# Patient Record
Sex: FEMALE | Race: WHITE | Employment: FULL TIME | URBAN - METROPOLITAN AREA
[De-identification: names, ages, dates, MRNs, and addresses within clinical notes are randomized per-mention and may not be internally consistent; named-entity substitution may affect disease eponyms.]

---

## 2019-12-13 ENCOUNTER — HOSPITAL ENCOUNTER (OUTPATIENT)
Dept: PHYSICAL THERAPY | Age: 54
Discharge: HOME | End: 2019-12-13
Payer: COMMERCIAL

## 2019-12-13 PROCEDURE — 97110 THERAPEUTIC EXERCISES: CPT

## 2019-12-13 PROCEDURE — 97162 PT EVAL MOD COMPLEX 30 MIN: CPT

## 2019-12-13 NOTE — PROGRESS NOTES
PHYSICAL THERAPY - DAILY TREATMENT NOTE    Patient Name: Opal Hobson        Date: 2019  : 1965   yes Patient  Verified  Visit #:     Insurance: Payor: Alka Brooks / Plan: [address] [place] [address] / Product Type: PPO /      In time: 230 Out time: 305   Total Treatment Time: 35     Medicare/[facility] Time Tracking (below)   Total Timed Codes (min):  na 1:1 Treatment Time:  na     TREATMENT AREA =  Pain in right shoulder [M25.511]    SUBJECTIVE  Pain Level (on 0 to 10 scale):  9  / 10   Medication Changes/New allergies or changes in medical history, any new surgeries or procedures?    no  If yes, update Summary List   Subjective Functional Status/Changes:  []  No changes reported     See POC          OBJECTIVE    10 min Therapeutic Exercise:  [x]  See flow sheet   Rationale:      increase ROM and increase strength to improve the patients ability to perform self care activities. Billed With/As:   [x] TE   [] TA   [] Neuro   [] Self Care Patient Education: [x] Review HEP    [] Progressed/Changed HEP based on:   [] positioning   [] body mechanics   [] transfers   [] heat/ice application    [] other:      Other Objective/Functional Measures:    See POC     Post Treatment Pain Level (on 0 to 10) scale:   6  / 10     ASSESSMENT  Assessment/Changes in Function:     See POC     []  See Progress Note/Recertification   Patient will continue to benefit from skilled PT services to modify and progress therapeutic interventions, address functional mobility deficits, address ROM deficits, address strength deficits, analyze and address soft tissue restrictions, analyze and cue movement patterns, analyze and modify body mechanics/ergonomics and assess and modify postural abnormalities to attain remaining goals.    Progress toward goals / Updated goals:    See POC     PLAN  [x]  Upgrade activities as tolerated yes Continue plan of care   []  Discharge due to :    []  Other:      Therapist: Jie Rubio Brittaney Jung, PT    Date: 12/13/2019 Time: 3:11 PM     Future Appointments   Date Time Provider Stephon Ochoai   12/17/2019  9:30 AM Mearl [facility]   12/20/2019  1:00 PM Evelin Callaway, PT [facility]   12/31/2019  4:00 PM Brigette WU [facility]   1/2/2020  3:00 PM Mearl [facility]   1/7/2020  4:00 PM Mearl [facility]   1/9/2020  4:00 PM Tyson Cowan [facility]   1/14/2020  4:00 PM Tyson Cowan [facility]   1/16/2020  4:00 PM Mearl [facility]   2/6/2020 10:00 AM [facility] MATHEW SCHED   2/6/2020 10:20 AM ZULEMA Nolen

## 2019-12-13 NOTE — PROGRESS NOTES
[facility] [place], [address], [address] [facility]       Phone: [phone]  Fax: [phone] / [phone] [facility]  Patient Name: Sheeba Juarez : 1965   Medical   Diagnosis: R shoulder pain Treatment Diagnosis: Pain in right shoulder [M25.511]   Onset Date: Chronic     Referral Source: [facility]* [facility]): 2019   Prior Hospitalization: See medical history Provider #: [id number]   Prior Level of Function: Chronic history of difficulty with reaching, lifting, pushing, pulling and self care activities. Comorbidities: Asthma, Latex allergy, L RCR    Medications: Verified on Patient Summary List   The Plan of Care and following information is based on the information from the initial evaluation.   ===========================================================================================  Assessment / key information:  Patient is a 62year old female presenting to therapy with signs and symptoms consistent with R shoulder pain. Patient states her symptoms began around  after she had a L RCR. Patient states she had to perform most activities with her R UE and felt like she was over using it. Patient states she recently began to experience significant pain into her R shoulder causing her to go to the MD. Patient received an x-ray and MRI which were positive for bone spurs but no RC tears. Patient received a cortisone injection and referred to PT. Patient reports increased difficulty with reaching, lifting, pushing, pulling, carrying and self care activities. Patient notes symptoms feel better with rest. Patient rates pain at worst 10/10 and 7/10 at best.   Objective Data: Inspection- Poor seated posture, forward head, rounded shoulders.  Shoulder AROM: flex R 85 (P!) L 125 (P!); Abd R 105 (P!), L 125 (P!); DANIELLE R R ear (P!), L top of head (P!); FIR R R hip (P!), L L PSIS (P!). R shoulder PROM limited in all planes secondary to pain: flex 120, abd 100, ER 50. Strength Testing: Shoulder flex R 4-/5 (P!), L 4/5 (P!); abd R 4-/5 (P!), L 4/5 (P!); ER R 4-/5 (P!), L 4/5 (P!); IR R 4/5 (P!), L 4/5 (P!). Special Tests: (+) Empty Can, (+)[place]. FOTO: 36/100. Patient educated on diagnosis, prognosis, POC and HEP. Patient issued copy of HEP and denied additional questions. Patient will benefit from skilled PT in order to address these impairments and functional limitations.   ===========================================================================================  Eval Complexity: History HIGH Complexity :3+ comorbidities / personal factors will impact the outcome/ POC ;  Examination  HIGH Complexity : 4+ Standardized tests and measures addressing body structure, function, activity limitation and / or participation in recreation ; Presentation MEDIUM Complexity : Evolving with changing characteristics ; Decision Making MEDIUM Complexity : FOTO score of 26-74; Overall Complexity MEDIUM  Problem List: pain affecting function, decrease ROM, decrease strength, decrease ADL/ functional abilitiies, decrease activity tolerance and decrease flexibility/ joint mobility   Treatment Plan may include any combination of the following: Therapeutic exercise, Therapeutic activities, Neuromuscular re-education, Physical agent/modality, Manual therapy, Patient education and Functional mobility training  Patient / Family readiness to learn indicated by: asking questions, trying to perform skills and interest  Persons(s) to be included in education: patient (P)  Barriers to Learning/Limitations: no  Measures taken:    Patient Goal (s): Reduce pain, improve motion   Patient self reported health status: good  Rehabilitation Potential: good   Short Term Goals: To be accomplished in  3  weeks:  1.  Patient will demonstrate independence with HEP for self management of symptoms. 2. Patient will report reduction in pain at worst to 5-6/10 in order to improve sleeping habits.  Long Term Goals: To be accomplished in  6  weeks:  1. Patient will improve FOTO to >/= 56/100 in order to improve quality of life. 2. Patient will report reduction in pain at worst to 2-3/10 in order to improve tolerance to self care activities. 3. Patient will improve R shoulder flexion and abduction AROM to 130 degrees without pain in order to improve tolerance to reaching activities. Frequency / Duration:   Patient to be seen  2-3  times per week for 6  weeks:  Patient / Caregiver education and instruction: self care, activity modification and exercises  G-Codes (GP): shakir  Therapist Signature: Jd Zamorano PT Date: 02/13/3172   Certification Period: na Time: 3:05 PM   ===========================================================================================  I certify that the above Physical Therapy Services are being furnished while the patient is under my care. I agree with the treatment plan and certify that this therapy is necessary. Physician Signature:        Date:       Time:     Please sign and return to [facility] or you may fax the signed copy to [phone]. Thank you.

## 2019-12-17 ENCOUNTER — HOSPITAL ENCOUNTER (OUTPATIENT)
Dept: PHYSICAL THERAPY | Age: 54
End: 2019-12-17
Payer: COMMERCIAL

## 2019-12-20 ENCOUNTER — APPOINTMENT (OUTPATIENT)
Dept: PHYSICAL THERAPY | Age: 54
End: 2019-12-20
Payer: COMMERCIAL

## 2020-01-07 ENCOUNTER — APPOINTMENT (OUTPATIENT)
Dept: PHYSICAL THERAPY | Age: 55
End: 2020-01-07
Payer: COMMERCIAL

## 2020-01-09 ENCOUNTER — APPOINTMENT (OUTPATIENT)
Dept: PHYSICAL THERAPY | Age: 55
End: 2020-01-09
Payer: COMMERCIAL

## 2020-01-09 ENCOUNTER — HOSPITAL ENCOUNTER (OUTPATIENT)
Dept: PHYSICAL THERAPY | Age: 55
Discharge: HOME | End: 2020-01-09
Payer: COMMERCIAL

## 2020-01-09 PROCEDURE — 97140 MANUAL THERAPY 1/> REGIONS: CPT

## 2020-01-09 PROCEDURE — 97110 THERAPEUTIC EXERCISES: CPT

## 2020-01-09 NOTE — PROGRESS NOTES
[address] PHYSICAL THERAPY  [address] [place] [address],[facility], [address] - Phone: [phone]  Fax: [phone]  PROGRESS NOTE  Patient Name: Vivi Cali : 1965   Treatment/Medical Diagnosis: Pain in right shoulder [M25.511]   Referral Source: Angie Fletcher*     Date of Initial Visit: 19 Attended Visits: 2 Missed Visits: 3     SUMMARY OF TREATMENT  Patient has attended 2 PT sessions, including an initial evaluation, for R shoulder pain. Gap in attendance from 19 to 20 due to patient being sick, family being sick, and holidays. PT has included manual therapy, therapeutic exercises, patient education, and HEP to improve R shoulder ROM, flexibility, and strength. CURRENT STATUS  Patient has made limited progress with current PT interventions likely due to lack of attendance and significant pain with all movement. Pain elevated with work duties (ie: prolonged desk work) and reaching [facility]. Demonstrates good understanding and compliance with basic HEP. Current objective findings: flex 100 deg, abd 115 deg, functional ER top of head, functional IR to R hip, all directions with end range pain; tenderness noted in R posterior cuff. Goal/Measure of Progress Goal Met? 1. Patient will demonstrate independence with HEP for self management of symptoms. Status at last Eval: n/a Current Status: I and compliant with basic HEP met   2. Patient will report reduction in pain at worst to 5-6/10 in order to improve sleeping habits   Status at last Eval: 10/10 Current Status: 10+/10 Not met     New Goals to be achieved in __4__  weeks:  1. Patient will improve FOTO to >/= 56/100 in order to improve quality of life. 2. Patient will report reduction in pain at worst to 2-3/10 in order to improve tolerance to self care activities.   3. Patient will improve R shoulder flexion and abduction AROM to 130 degrees without pain in order to improve tolerance to reaching activities. RECOMMENDATIONS  Patient would continue to benefit from skilled PT for 2x/wk for 4 weeks to continue to improve R shoulder ROM, flexibility, and strength for functional ADLs and work duties. Thank you for this referral.     If you have any questions/comments please contact us directly at [phone]. Thank you for allowing us to assist in the care of your patient. Therapist Signature: JOHNIE Juárez Date: 1/9/2020     Time: 5:19 PM   NOTE TO PHYSICIAN:  PLEASE COMPLETE THE ORDERS BELOW AND FAX TO   [facility] Physical Therapy: [phone]  If you are unable to process this request in 24 hours please contact our office: [phone]    ___ I have read the above report and request that my patient continue as recommended.   ___ I have read the above report and request that my patient continue therapy with the following changes/special instructions:_________________________________________________________   ___ I have read the above report and request that my patient be discharged from therapy.      Physician Signature:        Date:       Time:

## 2020-01-09 NOTE — PROGRESS NOTES
PHYSICAL THERAPY - DAILY TREATMENT NOTE    Patient Name: Ivis Lee        Date: 2020  : 1965   yes Patient  Verified  Visit #:     Insurance: Payor: Aleyda Edward / Plan: [address] [place] [address] / Product Type: PPO /      In time: 4:00  Out time: 4:40   Total Treatment Time: 40     Medicare/[facility] Time Tracking (below)   Total Timed Codes (min):  40 1:1 Treatment Time: 35     TREATMENT AREA =  Pain in right shoulder [M25.511]    SUBJECTIVE  Pain Level (on 0 to 10 scale): 7  / 10   Medication Changes/New allergies or changes in medical history, any new surgeries or procedures?    no  If yes, update Summary List   Subjective Functional Status/Changes:  []  No changes reported     Patient has not been seen since IE on  due to being sick and the holidays. Reports max pain 10+/10 and min pain 7/10. Notices most pain during work duties and reaching [address]. SEE PN       OBJECTIVE    25/  30 min Therapeutic Exercise:  [x]  See flow sheet   Rationale:      increase ROM and increase strength to improve the patients ability to perform self care activities. 10 min Manual Therapy: STM to R post cuff and R pec minor; R GHJ PROM in all directions   Rationale:      decrease pain, increase ROM and increase tissue extensibility to improve patient's ability to perform reaching activities.        Billed With/As:   [x] TE   [] TA   [] Neuro   [] Self Care Patient Education: [x] Review HEP    [] Progressed/Changed HEP based on:   [] positioning   [] body mechanics   [] transfers   [] heat/ice application    [] other:      Other Objective/Functional Measures:    SEE PN     Post Treatment Pain Level (on 0 to 10) scale:  7  / 10     ASSESSMENT  Assessment/Changes in Function:     SEE PN     []  See Progress Note/Recertification   Patient will continue to benefit from skilled PT services to modify and progress therapeutic interventions, address functional mobility deficits, address ROM deficits, address strength deficits, analyze and address soft tissue restrictions, analyze and cue movement patterns, analyze and modify body mechanics/ergonomics and assess and modify postural abnormalities to attain remaining goals.    Progress toward goals / Updated goals:    SEE PN     PLAN  [x]  Upgrade activities as tolerated yes Continue plan of care   []  Discharge due to :    []  Other:      Therapist: JOHNIE Jimenez    Date: 1/9/2020 Time: 5:18 PM     Future Appointments   Date Time Provider Stephon Edouard   1/9/2020  4:00 PM [facility]   2/6/2020 10:00 AM [facility] ULTRASOUND [facility] OpenPM   2/6/2020 10:20 AM [facility], NP [facility] OpenPM

## 2020-01-14 ENCOUNTER — APPOINTMENT (OUTPATIENT)
Dept: PHYSICAL THERAPY | Age: 55
End: 2020-01-14
Payer: COMMERCIAL

## 2020-01-15 ENCOUNTER — HOSPITAL ENCOUNTER (OUTPATIENT)
Dept: PHYSICAL THERAPY | Age: 55
Discharge: HOME | End: 2020-01-15
Payer: COMMERCIAL

## 2020-01-15 PROCEDURE — 97140 MANUAL THERAPY 1/> REGIONS: CPT

## 2020-01-15 PROCEDURE — 97110 THERAPEUTIC EXERCISES: CPT

## 2020-01-15 NOTE — PROGRESS NOTES
PHYSICAL THERAPY - DAILY TREATMENT NOTE    Patient Name: Leah Heaton        Date: 1/15/2020  : 1965   yes Patient  Verified  Visit #:   3   of   12  Insurance: Payor: Mckenna Owusu / Plan: [facility] PPO / Product Type: PPO /      In time: 3:00 Out time: 3:35   Total Treatment Time: 35     Medicare/[employer] Time Tracking (below)   Total Timed Codes (min):  35 1:1 Treatment Time: 35     TREATMENT AREA =  Pain in right shoulder [M25.511]    SUBJECTIVE  Pain Level (on 0 to 10 scale): 7  / 10   Medication Changes/New allergies or changes in medical history, any new surgeries or procedures?    no  If yes, update Summary List   Subjective Functional Status/Changes:  []  No changes reported     Patient reports no new complaints of shoulder pain . OBJECTIVE    25 min Therapeutic Exercise:  [x]  See flow sheet   Rationale:      increase ROM and increase strength to improve the patients ability to perform self care activities. 10 min Manual Therapy: STM to R post cuff and R pec minor; R GHJ PROM in all directions   Rationale:      decrease pain, increase ROM and increase tissue extensibility to improve patient's ability to perform reaching activities. Billed With/As:   [x] TE   [] TA   [] Neuro   [] Self Care Patient Education: [x] Review HEP    [] Progressed/Changed HEP based on:   [] positioning   [] body mechanics   [] transfers   [] heat/ice application    [] other:      Other Objective/Functional Measures:    1:1 TE = 25' (1 TE billed)    Added T/S extension and low doorway stretch to improve UE functional mobility   Increase TTP in post cuff and pec area      Post Treatment Pain Level (on 0 to 10) scale:  8 / 10     ASSESSMENT  Assessment/Changes in Function:     Noted elevated pain following PT session likely due to PREs.  Encouraged pt to continue to ice at home to reduce pain      []  See Progress Note/Recertification   Patient will continue to benefit from skilled PT services to modify and progress therapeutic interventions, address functional mobility deficits, address ROM deficits, address strength deficits, analyze and address soft tissue restrictions, analyze and cue movement patterns, analyze and modify body mechanics/ergonomics and assess and modify postural abnormalities to attain remaining goals. Progress toward goals / Updated goals:    New Goals to be achieved in __4__  weeks:  1. Patient will improve FOTO to >/= 56/100 in order to improve quality of life. 2. Patient will report reduction in pain at worst to 2-3/10 in order to improve tolerance to self care activities.    3. Patient will improve R shoulder flexion and abduction AROM to 130 degrees without pain in order to improve tolerance to reaching activities.     No significant progress towards PT goals today     PLAN  [x]  Upgrade activities as tolerated yes Continue plan of care   []  Discharge due to :    []  Other:      Therapist: JOHNIE Summers    Date: 1/15/2020 Time: 4:47 PM     Future Appointments   Date Time Provider Stephon Edouard   1/15/2020  3:00 PM Jacqueline Cowan [facility]   2020  2:30 PM Noemi Mccormick [facility]   2020  4:00 PM Chapo Gaviarash [facility]   2020  3:30 PM Chapo arash [facility]   2020  3:30 PM Jacqueline Cowan [facility]   2020  3:30 PM Noemi Mccormick [facility]   2020  4:00 PM Noemi Mccormick [facility]   2020 10:00 AM [facility] [address]  [facility]   2020 10:20 AM Herold Apgar, NP [facility] Open[facility]   2020  4:00 PM Jacqueline Cowan [facility]   2020  4:00 PM Chapo arash [facility]   2020  3:30 PM Noemi Mccormick [address] [place] [address]

## 2020-01-16 ENCOUNTER — HOSPITAL ENCOUNTER (OUTPATIENT)
Dept: PHYSICAL THERAPY | Age: 55
Discharge: HOME | End: 2020-01-16
Payer: COMMERCIAL

## 2020-01-16 ENCOUNTER — APPOINTMENT (OUTPATIENT)
Dept: PHYSICAL THERAPY | Age: 55
End: 2020-01-16
Payer: COMMERCIAL

## 2020-01-16 PROCEDURE — 97110 THERAPEUTIC EXERCISES: CPT

## 2020-01-16 PROCEDURE — 97140 MANUAL THERAPY 1/> REGIONS: CPT

## 2020-01-16 NOTE — PROGRESS NOTES
PHYSICAL THERAPY - DAILY TREATMENT NOTE    Patient Name: Corrie Frey        Date: 2020  : 1965   yes Patient  Verified  Visit #:      12  Insurance: Payor: BLUE CROSS / Plan: [facility] PPO / Product Type: PPO /      In time: 2:29 Out time: 3:12   Total Treatment Time: 49     Medicare/[employer] Time Tracking (below)   Total Timed Codes (min):  39 1:1 Treatment Time: 39     TREATMENT AREA =  Pain in right shoulder [M25.511]    SUBJECTIVE  Pain Level (on 0 to 10 scale): 10  / 10   Medication Changes/New allergies or changes in medical history, any new surgeries or procedures?    no  If yes, update Summary List   Subjective Functional Status/Changes:  []  No changes reported     Patient reports waking up with a lot of pain in the back of the R shoulder       OBJECTIVE    Modalities Rationale:     decrease inflammation and decrease pain to improve patient's ability to perform work duties. min [] Estim, type/location:                                      []  att     []  unatt     []  w/US     []  w/ice    []  w/heat    min []  Mechanical Traction: type/lbs                   []  pro   []  sup   []  int   []  cont    []  before manual    []  after manual    min []  Ultrasound, settings/location:      min []  Iontophoresis w/ dexamethasone, location:                                               []  take home patch       []  in clinic   10 Min [x]  Ice     []  Heat    Location/position: To B shoulders in sitting post-session    min []  Vasopneumatic Device, press/temp:     min []  Other:    [] Skin assessment post-treatment (if applicable):    []  intact    []  redness- no adverse reaction     []redness - adverse reaction:          29 min Therapeutic Exercise:  [x]  See flow sheet   Rationale:      increase ROM and increase strength to improve the patients ability to perform self care activities.       10 min Manual Therapy: STM to R post cuff and R pec minor; R GHJ PROM in all directions   Rationale:      decrease pain, increase ROM and increase tissue extensibility to improve patient's ability to perform reaching activities. Billed With/As:   [x] TE   [] TA   [] Neuro   [] Self Care Patient Education: [x] Review HEP    [] Progressed/Changed HEP based on:   [] positioning   [] body mechanics   [] transfers   [] heat/ice application    [] other:      Other Objective/Functional Measures:    1:1 TE = 29'     Modified therex due to increase pain level  Increase TTP across post cuff      Post Treatment Pain Level (on 0 to 10) scale:  9 / 10     ASSESSMENT  Assessment/Changes in Function:     Noted reduced pain following PT session likely due to modalities. Progress limited thus far due to pain. []  See Progress Note/Recertification   Patient will continue to benefit from skilled PT services to modify and progress therapeutic interventions, address functional mobility deficits, address ROM deficits, address strength deficits, analyze and address soft tissue restrictions, analyze and cue movement patterns, analyze and modify body mechanics/ergonomics and assess and modify postural abnormalities to attain remaining goals. Progress toward goals / Updated goals:    New Goals to be achieved in __4__  weeks:  1. Patient will improve FOTO to >/= 56/100 in order to improve quality of life. 2. Patient will report reduction in pain at worst to 2-3/10 in order to improve tolerance to self care activities.    3. Patient will improve R shoulder flexion and abduction AROM to 130 degrees without pain in order to improve tolerance to reaching activities.     No significant progress towards PT goals today     PLAN  [x]  Upgrade activities as tolerated yes Continue plan of care   []  Discharge due to :    []  Other:      Therapist: JOHNIE Carney    Date: 1/16/2020 Time: 4:00 PM     Future Appointments   Date Time Provider Stephon Edouard   1/21/2020  4:00 PM Kathya Mcdonald 1/23/2020  3:30 PM Melchor, Devora Peabody [facility]   1/28/2020  3:30 PM Hamzah Cowan [facility]   1/30/2020  3:30 PM Rosamaria Greene [facility]   2/4/2020  4:00 PM [facility]   2/6/2020 10:00 AM [facility] OpenPM   2/6/2020 10:20 AM Ronit Anderson NP [facility]   2/6/2020  4:00 PM Hamzah Cowan [facility]   2/11/2020  4:00 PM Hamzah Cowan [facility]   2/13/2020  3:30 PM Rosamaria Greene [facility]

## 2020-01-21 ENCOUNTER — HOSPITAL ENCOUNTER (OUTPATIENT)
Dept: PHYSICAL THERAPY | Age: 55
Discharge: HOME | End: 2020-01-21
Payer: COMMERCIAL

## 2020-01-21 PROCEDURE — 97110 THERAPEUTIC EXERCISES: CPT

## 2020-01-21 PROCEDURE — 97140 MANUAL THERAPY 1/> REGIONS: CPT

## 2020-01-21 NOTE — PROGRESS NOTES
PHYSICAL THERAPY - DAILY TREATMENT NOTE    Patient Name: Gustavo Dexter        Date: 2020  : 1965   yes Patient  Verified  Visit #:      of   12  Insurance: Payor: Julio Ewing / Plan: [facility] PPO / Product Type: PPO /      In time: 4:00 Out time: 4:48   Total Treatment Time: 48     Medicare/BC Time Tracking (below)   Total Timed Codes (min):  38 1:1 Treatment Time: 38     TREATMENT AREA =  Pain in right shoulder [M25.511]    SUBJECTIVE  Pain Level (on 0 to 10 scale): 8  / 10   Medication Changes/New allergies or changes in medical history, any new surgeries or procedures?    no  If yes, update Summary List   Subjective Functional Status/Changes:  []  No changes reported     Patient reports more pain in the back of her R shoulder possibly due to more writing/typing at work        OBJECTIVE    Modalities Rationale:     decrease inflammation and decrease pain to improve patient's ability to perform work duties. min [] Estim, type/location:                                      []  att     []  unatt     []  w/US     []  w/ice    []  w/heat    min []  Mechanical Traction: type/lbs                   []  pro   []  sup   []  int   []  cont    []  before manual    []  after manual    min []  Ultrasound, settings/location:      min []  Iontophoresis w/ dexamethasone, location:                                               []  take home patch       []  in clinic   10 Min [x]  Ice     []  Heat    Location/position: To R shoulder in sitting post-session    min []  Vasopneumatic Device, press/temp:     min []  Other:    [] Skin assessment post-treatment (if applicable):    []  intact    []  redness- no adverse reaction     []redness - adverse reaction:          23 min Therapeutic Exercise:  [x]  See flow sheet   Rationale:      increase ROM and increase strength to improve the patients ability to perform self care activities.       15 min Manual Therapy: STM to R UT, R rhomboids, R post cuff and R pec minor; R GHJ PROM in all directions   Rationale:      decrease pain, increase ROM and increase tissue extensibility to improve patient's ability to perform reaching activities. Billed With/As:   [x] TE   [] TA   [] Neuro   [] Self Care Patient Education: [x] Review HEP    [] Progressed/Changed HEP based on:   [] positioning   [] body mechanics   [] transfers   [] heat/ice application    [] other:      Other Objective/Functional Measures:    1:1 TE = 23'    TTP along rhomboids and muscle belly of UT. Improved PROM flex with elbow flex vs ext. Continued therex per flowsheet     Post Treatment Pain Level (on 0 to 10) scale:  8 / 10     ASSESSMENT  Assessment/Changes in Function:     Patient demo limited progress with PT interventions due to elevated pain levels with all activity. []  See Progress Note/Recertification   Patient will continue to benefit from skilled PT services to modify and progress therapeutic interventions, address functional mobility deficits, address ROM deficits, address strength deficits, analyze and address soft tissue restrictions, analyze and cue movement patterns, analyze and modify body mechanics/ergonomics and assess and modify postural abnormalities to attain remaining goals. Progress toward goals / Updated goals:    No significant progress towards LTGs.       PLAN  [x]  Upgrade activities as tolerated yes Continue plan of care   []  Discharge due to :    []  Other:      Therapist: JOHNIE Corona    Date: 1/21/2020 Time: 5:15 PM     Future Appointments   Date Time Provider Stephon Edouard   1/21/2020  4:00 PM Steven Cowan [facility]   1/23/2020  3:30 PM Brandon Cowan [facility]   1/28/2020  3:30 PM Brandon Cowan [facility]   1/30/2020  3:30 PM Candice Foots [facility]   2/4/2020  4:00 PM Ada Gipson [facility]   2/6/2020 10:00 AM [facility]   2/6/2020 10:20 AM Alicia Knight NP [facility]   2/6/2020 4:00 PM Deepthi Cowan [facility]   2/11/2020  4:00 PM Deepthi Cowan [facility]   2/13/2020  3:30 PM Moon Tesfaye

## 2020-01-23 ENCOUNTER — APPOINTMENT (OUTPATIENT)
Dept: PHYSICAL THERAPY | Age: 55
End: 2020-01-23
Payer: COMMERCIAL

## 2020-01-28 ENCOUNTER — APPOINTMENT (OUTPATIENT)
Dept: PHYSICAL THERAPY | Age: 55
End: 2020-01-28
Payer: COMMERCIAL

## 2020-01-28 ENCOUNTER — HOSPITAL ENCOUNTER (OUTPATIENT)
Dept: PHYSICAL THERAPY | Age: 55
Discharge: HOME | End: 2020-01-28
Payer: COMMERCIAL

## 2020-01-30 ENCOUNTER — HOSPITAL ENCOUNTER (OUTPATIENT)
Dept: PHYSICAL THERAPY | Age: 55
Discharge: HOME | End: 2020-01-30
Payer: COMMERCIAL

## 2020-01-30 PROCEDURE — 97140 MANUAL THERAPY 1/> REGIONS: CPT

## 2020-01-30 PROCEDURE — 97110 THERAPEUTIC EXERCISES: CPT

## 2020-01-30 NOTE — PROGRESS NOTES
PHYSICAL THERAPY - DAILY TREATMENT NOTE    Patient Name: Sujey Velazquez        Date: 2020  : 1965   yes Patient  Verified  Visit #:     Insurance: Payor: Darryle Blander / Plan: [employer] [id number] PPO / Product Type: PPO /      In time: 3:25 Out time: 4:10   Total Treatment Time: 45     Medicare/BCBS Time Tracking (below)   Total Timed Codes (min):  35 1:1 Treatment Time: 25     TREATMENT AREA =  Pain in right shoulder [M25.511]    SUBJECTIVE  Pain Level (on 0 to 10 scale): 8  / 10   Medication Changes/New allergies or changes in medical history, any new surgeries or procedures?    no  If yes, update Summary List   Subjective Functional Status/Changes:  []  No changes reported     Patient reports no new complaints of pain. MD F/U appt the week after 20, unsure of the day       OBJECTIVE    Modalities Rationale:     decrease inflammation and decrease pain to improve patient's ability to perform work duties. min [] Estim, type/location:                                      []  att     []  unatt     []  w/US     []  w/ice    []  w/heat    min []  Mechanical Traction: type/lbs                   []  pro   []  sup   []  int   []  cont    []  before manual    []  after manual    min []  Ultrasound, settings/location:      min []  Iontophoresis w/ dexamethasone, location:                                               []  take home patch       []  in clinic   10 Min [x]  Ice     []  Heat    Location/position: To R shoulder in sitting post-session    min []  Vasopneumatic Device, press/temp:     min []  Other:    [] Skin assessment post-treatment (if applicable):    []  intact    []  redness- no adverse reaction     []redness - adverse reaction:          10/  20 min Therapeutic Exercise:  [x]  See flow sheet   Rationale:      increase ROM and increase strength to improve the patients ability to perform self care activities.       15 min Manual Therapy: STM to R UT, R rhomboids, R post cuff and R pec minor; R GHJ PROM in all directions   Rationale:      decrease pain, increase ROM and increase tissue extensibility to improve patient's ability to perform reaching activities. Billed With/As:   [x] TE   [] TA   [] Neuro   [] Self Care Patient Education: [x] Review HEP    [] Progressed/Changed HEP based on:   [] positioning   [] body mechanics   [] transfers   [] heat/ice application    [] other:      Other Objective/Functional Measures:    1:1 TE = 10'    Continued therex per flowsheet  ERP with elevation in SAS noted during MT      Post Treatment Pain Level (on 0 to 10) scale:  7 / 10     ASSESSMENT  Assessment/Changes in Function:     Patient making limited progress with current PT interventions due to elevated pain with all GHJ movement. []  See Progress Note/Recertification   Patient will continue to benefit from skilled PT services to modify and progress therapeutic interventions, address functional mobility deficits, address ROM deficits, address strength deficits, analyze and address soft tissue restrictions, analyze and cue movement patterns, analyze and modify body mechanics/ergonomics and assess and modify postural abnormalities to attain remaining goals. Progress toward goals / Updated goals:    No significant progress towards LTGs.       PLAN  [x]  Upgrade activities as tolerated yes Continue plan of care   []  Discharge due to :    []  Other:      Therapist: JOHNIE Corona    Date: 1/30/2020 Time: 5:30 PM     Future Appointments   Date Time Provider Stephon Edouard   1/30/2020  3:30 PM [facility]   2/4/2020  4:00 PM [facility]   2/6/2020 10:00 AM [facility]   2/6/2020 10:20 AM Alicia Knight NP [facility]   2/6/2020  4:00 PM [facility]   2/11/2020  4:00 PM Brandon Cowan [facility]   2/13/2020  3:30 PM [facility]

## 2020-02-04 ENCOUNTER — HOSPITAL ENCOUNTER (OUTPATIENT)
Dept: PHYSICAL THERAPY | Age: 55
Discharge: HOME | End: 2020-02-04
Payer: COMMERCIAL

## 2020-02-04 PROCEDURE — 97140 MANUAL THERAPY 1/> REGIONS: CPT

## 2020-02-04 PROCEDURE — 97110 THERAPEUTIC EXERCISES: CPT

## 2020-02-04 NOTE — PROGRESS NOTES
[address] [place] [address], [address] [facility] - Phone: [phone]  Fax: [phone] SUMMARY  Patient Name: Cecilia Dudley : 1965   Treatment/Medical Diagnosis: Pain in right shoulder [M25.511]   Referral Source: Grabiel Miller*     Date of Initial Visit: 19 Attended Visits: 7 Missed Visits: 4     SUMMARY OF TREATMENT  Patient attended 7 PT sessions, including an initial evaluation, for R shoulder pain. Gap in attendance from 19 to 20 due to patient being sick, family being sick, and holidays. PT has included manual therapy, therapeutic exercises, patient education, and HEP to improve R shoulder ROM, flexibility, and strength. CURRENT STATUS  Patient has made limited progress with R shoulder pain with current PT interventions. Patient reports no improvement since beginning PT for R shoulder pain. In the last 2 weeks, min pain 2/10 and max 7/10. Patient contributes most of the pain to prolonged elevation at 90 deg for work duties, primarily located in the anterior shoulder area. Able to temporarily alleviate pain with ice. Current objective findings: R shoulder AROM flex 110 deg, abd 105 deg, functional ER top of head, functional IR R hip. All directions with end range pain; R shoulder PROM flex 130 deg, abd 125 deg, ER (with arm abd at 45 deg) 40 deg, IR (with arm abd at 45 deg) 35 deg. All directions with end range pain; significant tenderness in anterior shoulder/biceps tendon area; R shoulder strength reduced and painful in all directions    Goal/Measure of Progress Goal Met? 1. Patient will improve FOTO to >/= 56/100 in order to improve quality of life. Status at last Eval: 36/100 Current Status: 43/100 progressing   2. Patient will report reduction in pain at worst to 2-3/10 in order to improve tolerance to self care activities.    Status at last Eval: 10/10 Current Status: 10/10 No change   3. Patient will improve R shoulder flexion and abduction AROM to 130 degrees without pain in order to improve tolerance to reaching activities.    Status at last Eval: Flex 100 deg   abd 115 deg Current Status: Flex 110 deg  abd 105 deg Partially progressing     RECOMMENDATIONS  Discontinue therapy due to lack of appreciable progress towards goals. Patient was advised to return to your office for further assessment. Thank you for this referral.     If you have any questions/comments please contact us directly at [phone]. Thank you for allowing us to assist in the care of your patient.     Therapist Signature: JOHNIE Wolfe Date: 02/04/20     Time: 5:16 PM

## 2020-02-04 NOTE — PROGRESS NOTES
PHYSICAL THERAPY - DAILY TREATMENT NOTE    Patient Name: Ivis eLe        Date: 2020  : 1965   yes Patient  Verified  Visit #:     Insurance: Payor: Rajwinderdarien Edward / Plan: [facility] PPO / Product Type: PPO /      In time: 3:55 Out time: 4:48   Total Treatment Time: 53     Medicare/[facility] Time Tracking (below)   Total Timed Codes (min):  43 1:1 Treatment Time: 30     TREATMENT AREA =  Pain in right shoulder [M25.511]    SUBJECTIVE  Pain Level (on 0 to 10 scale): 7  / 10   Medication Changes/New allergies or changes in medical history, any new surgeries or procedures?    no  If yes, update Summary List   Subjective Functional Status/Changes:  []  No changes reported     Patient reports no improvement since beginning PT for R shoulder pain. In the last 2 weeks, min pain 2/10 and max 7/10. Patient contributes most of the pain to prolonged elevation at 90 deg for work duties, primarily located in the anterior shoulder area. Able to temporarily alleviate pain with ice. SEE D/C       OBJECTIVE    Modalities Rationale:     decrease inflammation and decrease pain to improve patient's ability to perform work duties.     min [] Estim, type/location:                                      []  att     []  unatt     []  w/US     []  w/ice    []  w/heat    min []  Mechanical Traction: type/lbs                   []  pro   []  sup   []  int   []  cont    []  before manual    []  after manual    min []  Ultrasound, settings/location:      min []  Iontophoresis w/ dexamethasone, location:                                               []  take home patch       []  in clinic   10 Min [x]  Ice     []  Heat    Location/position: To R shoulder in semi recline post-session    min []  Vasopneumatic Device, press/temp:     min []  Other:    [] Skin assessment post-treatment (if applicable):    []  intact    []  redness- no adverse reaction     []redness - adverse reaction:          15/  28 min Therapeutic Exercise:  [x]  See flow sheet   Rationale:      increase ROM and increase strength to improve the patients ability to perform self care activities. 15 min Manual Therapy: STM to R UT, R rhomboids, R post cuff and R pec minor; R GHJ PROM in all directions   Rationale:      decrease pain, increase ROM and increase tissue extensibility to improve patient's ability to perform reaching activities. Billed With/As:   [x] TE   [] TA   [] Neuro   [] Self Care Patient Education: [x] Review HEP    [] Progressed/Changed HEP based on:   [] positioning   [] body mechanics   [] transfers   [] heat/ice application    [] other:      Other Objective/Functional Measures:    1:1 TE = 15'    R shoulder AROM: flex 110 deg, abd 105 deg, functional ER top of head, functional IR R hip. All directions with end range pain  R shoulder PROM: flex 130 deg, abd 125 deg, ER (with arm abd at 45 deg) 40 deg, IR (with arm abd at 45 deg) 35 deg. All directions with end range pain    SEE D/C     Post Treatment Pain Level (on 0 to 10) scale:  6 / 10     ASSESSMENT  Assessment/Changes in Function:     SEE D/C     []  See Progress Note/Recertification   Patient will continue to benefit from skilled PT services to modify and progress therapeutic interventions, address functional mobility deficits, address ROM deficits, address strength deficits, analyze and address soft tissue restrictions, analyze and cue movement patterns, analyze and modify body mechanics/ergonomics and assess and modify postural abnormalities to attain remaining goals. Progress toward goals / Updated goals:    SEE D/C     PLAN  []  Upgrade activities as tolerated no Continue plan of care   [x]  Discharge due to : Min to no progress towards PT goals. Returning to MD assessment.     []  Other:      Therapist: Claudius Litten Melchor, LPTA    Date: 2/4/2020 Time: 5:07 PM     Future Appointments   Date Time Provider Stephon Edouard   2/4/2020  4:00 PM Nat Gonzalez [facility]   2/6/2020 10:00 AM [facility]   2/6/2020 10:20 AM [place], [facility]ULE[facility]   2/6/2020  4:00 PM Merlene Cowan [facility]   2/11/2020  4:00 PM Merlene Cowan [facility]   2/13/2020  3:30 PM Aisha Carrington [facility]

## 2020-02-06 ENCOUNTER — APPOINTMENT (OUTPATIENT)
Dept: PHYSICAL THERAPY | Age: 55
End: 2020-02-06
Payer: COMMERCIAL

## 2020-02-11 ENCOUNTER — APPOINTMENT (OUTPATIENT)
Dept: PHYSICAL THERAPY | Age: 55
End: 2020-02-11
Payer: COMMERCIAL

## 2020-02-13 ENCOUNTER — APPOINTMENT (OUTPATIENT)
Dept: PHYSICAL THERAPY | Age: 55
End: 2020-02-13
Payer: COMMERCIAL

## 2020-03-12 ENCOUNTER — HOSPITAL ENCOUNTER (OUTPATIENT)
Dept: PHYSICAL THERAPY | Age: 55
Discharge: HOME | End: 2020-03-12
Payer: COMMERCIAL

## 2020-03-12 PROCEDURE — 97530 THERAPEUTIC ACTIVITIES: CPT

## 2020-03-12 PROCEDURE — 97162 PT EVAL MOD COMPLEX 30 MIN: CPT

## 2020-03-12 NOTE — PROGRESS NOTES
PHYSICAL THERAPY - DAILY TREATMENT NOTE    Patient Name: Lamar Perez        Date: 3/12/2020  : 1965   yes Patient  Verified  Visit #:   1     Insurance: Payor: Claudeen Lao / Plan: [facility] PPO / Product Type: PPO /      In time: 835 Out time: 915   Total Treatment Time: 40     Medicare/BCBS Time Tracking (below)   Total Timed Codes (min):  10 1:1 Treatment Time:  10     TREATMENT AREA =  Right shoulder pain [M25.511]    SUBJECTIVE  Pain Level (on 0 to 10 scale):  6  / 10   Medication Changes/New allergies or changes in medical history, any new surgeries or procedures?    no  If yes, update Summary List   Subjective Functional Status/Changes:  []  No changes reported     SEE POC         OBJECTIVE  Modalities Rationale:     decrease inflammation and decrease pain to improve patient's ability to perform functional ADL's   min [] Estim, type/location:                                      []  att     []  unatt     []  w/US     []  w/ice    []  w/heat    min []  Mechanical Traction: type/lbs                   []  pro   []  sup   []  int   []  cont    []  before manual    []  after manual    min []  Ultrasound, settings/location:      min []  Iontophoresis w/ dexamethasone, location:                                               []  take home patch       []  in clinic   10 min [x]  Ice     []  Heat    location/position: Semireclined to the R shoulder    min []  Vasopneumatic Device, press/temp:     min []  Other:    [] Skin assessment post-treatment (if applicable):    []  intact    []  redness- no adverse reaction     []redness - adverse reaction:        20 min Evaluation     10 min Therapeutic Activity:  Educated patient on the proper don/doff technique for the sling.  Pt able to   Rationale:      increase patient education of proper sling use/wear     Billed With/As:   [] TE   [] TA   [] Neuro   [] Self Care Patient Education: [x] Review HEP    [] Progressed/Changed HEP based on: [] positioning   [] body mechanics   [] transfers   [] heat/ice application    [] other:      Other Objective/Functional Measures:    SEE POC    Pt was unable to tolerate PROM for objective assessment at the IE due to significant increase in pain as well as reports of \"feeling faint\". Pt was given water and CP was put on the R shoulder. Pt reports symptoms subsided within about 2-3 minutes. Pt was able to perform donning her sling I following the IE and verbalized she felt back to normal. Pt also verbalized she felt safe to return home following IE and was able to I leave the clinic. Post Treatment Pain Level (on 0 to 10) scale:   6  / 10     ASSESSMENT  Assessment/Changes in Function:     Evaluation Code Complexity: History MEDIUM  Complexity : 1-2 comorbidities / personal factors will impact the outcome/ POC ; Examination HIGH Complexity : 4+ Standardized tests and measures addressing body structure, function, activity limitation and / or participation in recreation ; Presentation MEDIUM Complexity : Evolving with changing characteristics ; Decision Making Other outcome measures clinical judgement  MEDIUM; Complexity MEDIUM    Justification for Eval Code Complexity:  Patient History : Asthma, Latex allergy  Examination SEE ABOVE EXAM  Clinical Presentation: evolving pain  Clinical Decision Making : clinical judgement         []  See Progress Note/Recertification   Patient will continue to benefit from skilled PT services to modify and progress therapeutic interventions, address functional mobility deficits, address ROM deficits, address strength deficits, analyze and address soft tissue restrictions, analyze and cue movement patterns, analyze and modify body mechanics/ergonomics and assess and modify postural abnormalities to attain remaining goals.    Progress toward goals / Updated goals:    SEE POC     PLAN  []  Upgrade activities as tolerated yes Continue plan of care   []  Discharge due to :    [x] Other: Pt will be seen 1-2 times per week for 18-24 sessions.       Therapist: Justine Lorenzo, PT, DPT    Date: 3/12/2020 Time: 8:23 AM     Future Appointments   Date Time Provider Stephon Edouard   3/12/2020  9:00 AM Elizabeth Higgins, PT [facility]

## 2020-03-12 NOTE — PROGRESS NOTES
[address] [place] [address], [address] [facility] - Phone: [phone]  Fax: 24-02-98-50 OF CARE / [address] [facility]  Patient Name: Chari Perry : 1965   Medical   Diagnosis: Right shoulder pain [M25.511] Treatment Diagnosis: R shoulder arthroscopy 3/6/2020   Onset Date: DOS 3/6/2020     Referral Source: Jelani Oliva Start of Care [facility]): 3/12/2020   Prior Hospitalization: See medical history Provider #: [id number]   Prior Level of Function: Chronic history of difficulty with reaching, lifting, pushing, pulling and self care activities. Comorbidities: Asthma, Latex Allergy, L RCR    Medications: Verified on Patient Summary List   The Plan of Care and following information is based on the information from the initial evaluation.   ==================================================================================  Assessment / key information:  Pt is 62year old female presenting s/p R Shoulder Arthroscopy on 3/6/2020. Pt reports gradual onset of symptoms prior to surgery following her L RTC repair in . Pt had conservative treatment prior to surgery including cortisone injections and PT. Pt presented to PT in abductor sling, however was not donned appropriately. Pain range 0-6/10. Pt denies any falls or complications following surgery. Pt denies any N/T in the lower arm or fingers. Pt reports compliance with HEP given by MD 3 times per day. Current Exam Findings: FHRS posture in sitting. 4 incisions along the shoulder covered with band-aids. Pt reports taking bandage off 3 days ago and covering with band-aids. Pt had moderate difficulty with PROM performed by PT due to increased pain symptoms and feeling \"faint\". Pt also reported not eating breakfast prior to appointment and PROM was held until first follow up visit.  Pt was able to recover from symptoms and verbalized she felt back to normal prior to leaving the clinic. Strength and special testing not performed. Sensation intact to light touch in the forearm and fingers. Pt educated on MD protocol, signs and symptoms of infection, as well as given and able to demonstrate correct for with initial HEP. The patient was instructed in a home exercise program to address the above findings/deficits. Pt will benefit from PT interventions to address the aforementioned deficits and allow pt to return to OF.      ==================================================================================  Eval Complexity: History MEDIUM  Complexity : 1-2 comorbidities / personal factors will impact the outcome/ POC ;  Examination  HIGH Complexity : 4+ Standardized tests and measures addressing body structure, function, activity limitation and / or participation in recreation ; Presentation MEDIUM Complexity : Evolving with changing characteristics ; Decision Making Other outcome measures clinical judgement  MEDIUM; Overall Complexity MEDIUM  Problem List: pain affecting function, decrease ROM, decrease strength, decrease ADL/ functional abilitiies, decrease activity tolerance, decrease flexibility/ joint mobility and decrease transfer abilities   Treatment Plan may include any combination of the following: Therapeutic exercise, Therapeutic activities, Neuromuscular re-education, Physical agent/modality, Manual therapy, Patient education, Self Care training and Functional mobility training  Patient / Family readiness to learn indicated by: asking questions, trying to perform skills and interest  Persons(s) to be included in education: patient (P)   Barriers to Learning/Limitations: None  Measures taken, if barriers to learning:    Patient Goal (s): Improve shoulder pain   Patient self reported health status: fair  Rehabilitation Potential: good  · Short Term Goals: To be accomplished in  6  treatments:  1.  Independent/compliant with long term HEP for shoulder PROM  2. Pt will achieve 130 degrees shoulder flexion passively to improve functional arm mobility. 3. Pt will be able to I don/doff sling in order to perform HEP as well as as bathing/dressing  · Long Term Goals: To be accomplished in  12  treatments:  1. Pt will progress towards AAROM/AROM in order to progress with functional ADL's.  2. Pt will be able to achieve 50-60 degrees ER passively in order to improve functional arm mobility. 3. Pt will report max pain <4/10 in order to improve functional ADL's     Frequency / Duration:   Patient to be seen  1-2  times per week for 18-24  treatments:  Patient / Caregiver education and instruction: exercises  G-Codes (GP): PRIYANKA  Therapist Signature: Poncho Murguia PT, DPT   Date: 8/11/0238   Certification Period: NA Time: 8:14 AM   ==================================================================================  I certify that the above Physical Therapy Services are being furnished while the patient is under my care. I agree with the treatment plan and certify that this therapy is necessary. Physician Signature:        Date:       Time:     Please sign and return to [facility] at [facility] [place], [facility]. or you may fax the signed copy to [phone]. Thank you.

## 2020-03-16 ENCOUNTER — HOSPITAL ENCOUNTER (OUTPATIENT)
Dept: PHYSICAL THERAPY | Age: 55
Discharge: HOME | End: 2020-03-16
Payer: COMMERCIAL

## 2020-03-16 PROCEDURE — 97140 MANUAL THERAPY 1/> REGIONS: CPT

## 2020-03-16 PROCEDURE — 97110 THERAPEUTIC EXERCISES: CPT

## 2020-03-16 NOTE — PROGRESS NOTES
PHYSICAL THERAPY - DAILY TREATMENT NOTE    Patient Name: Vivi Cali        Date: 3/16/2020  : 1965   yes Patient  Verified  Visit #:   2     Insurance: Payor: Dorie Landon / Plan: [facility] PPO / Product Type: PPO /      In time: 4:20 Out time: 5:31   Total Treatment Time: 71     Medicare/[facility] Time Tracking (below)   Total Timed Codes (min):  51 1:1 Treatment Time:  51     TREATMENT AREA =  Right shoulder pain [M25.511]    SUBJECTIVE  Pain Level (on 0 to 10 scale):  7 / 10   Medication Changes/New allergies or changes in medical history, any new surgeries or procedures?    no  If yes, update Summary List   Subjective Functional Status/Changes:  []  No changes reported     Patient reports not hurting as bad as it did the first time she had to come for PT. States that she's been taking 2 Aleve and 1 aspirin every day to help with the pain. Reports good HEP compliance and use of sling.    MD F/U appt on Wed (3/18)        OBJECTIVE  Modalities Rationale:     decrease inflammation and decrease pain to improve patient's ability to perform functional ADL's   min [] Estim, type/location:                                      []  att     []  unatt     []  w/US     []  w/ice    []  w/heat    min []  Mechanical Traction: type/lbs                   []  pro   []  sup   []  int   []  cont    []  before manual    []  after manual    min []  Ultrasound, settings/location:      min []  Iontophoresis w/ dexamethasone, location:                                               []  take home patch       []  in clinic   21 Min [x]  Ice     [x]  Heat    location/position: 10' MHP pre tx and 10' CP post tx in Semireclined to the R shoulder    min []  Vasopneumatic Device, press/temp:     min []  Other:    [] Skin assessment post-treatment (if applicable):    []  intact    []  redness- no adverse reaction     []redness - adverse reaction:          31 min Therapeutic Exercise:  [x]  See flow sheet Rationale:      increase ROM and increase strength to improve the patients ability to perform dressing activities     20 min Manual Therapy: STM to R UT; R GHJ and elbow PROM within MD protocol    Rationale:      decrease pain and increase ROM to improve patient's PROM>AAROM>AROM within MD protocol. Billed With/As:   [x] TE   [] TA   [] Neuro   [] Self Care Patient Education: [x] Review HEP    [] Progressed/Changed HEP based on:   [] positioning   [] body mechanics   [] transfers   [] heat/ice application    [] other:      Other Objective/Functional Measures:    1:1 TE = 31'     Initiated therex per POC (see flowsheet) to improve R shoulder mobility for functional ADLs. Req'd cues 100 % of therex for proper form/technique due to 1st F/U appt. R shoulder PROM flex: ~100 deg before onset of SAS pain   R elbow PROM ext: 5 deg from neutral        Post Treatment Pain Level (on 0 to 10) scale:  5  / 10     ASSESSMENT  Assessment/Changes in Function:     Demonstrated no exacerbation of symptoms following initiation of PT interventions. []  See Progress Note/Recertification   Patient will continue to benefit from skilled PT services to modify and progress therapeutic interventions, address functional mobility deficits, address ROM deficits, address strength deficits, analyze and address soft tissue restrictions, analyze and cue movement patterns, analyze and modify body mechanics/ergonomics and assess and modify postural abnormalities to attain remaining goals.    Progress toward goals / Updated goals:    Good progress towards STGs #1 and #3     PLAN  [x]  Upgrade activities as tolerated yes Continue plan of care   []  Discharge due to :    []  Other:      Therapist: Zollie Lesches, LPTA    Date: 3/16/2020 Time: 5:53 PM     Future Appointments   Date Time Provider Stephon Edouard   3/16/2020  4:30 PM [address] [facility], Xiomara Sathish [facility]   3/18/2020  4:30 PM Timoteo Vega [facility]   3/25/2020  4:30 PM Chapo Breanna Necessary [facility]   3/27/2020  2:30 PM Breanna Cowan [facility]   3/30/2020  4:30 PM Breanna Cowan [facility]   4/1/2020  4:30 PM Sylvester [place] [facility]   4/7/2020  4:30 PM Zach Mccollum, PT [facility]   4/9/2020  [facility]   [facility]/14/2020  [facility]   4/16/2020  2:30 PM [facility]   4/20/2020  4:30 PM Breanna Cowan [facility]   4/22/2020  4:30 PM Breanna Cowan [facility]   4/27/2020  4:30 PM Breanna Coawn [facility]   4/29/2020  4:30 PM Sylvester [facility]

## 2020-03-18 ENCOUNTER — HOSPITAL ENCOUNTER (OUTPATIENT)
Dept: PHYSICAL THERAPY | Age: 55
Discharge: HOME | End: 2020-03-18
Payer: COMMERCIAL

## 2020-03-18 PROCEDURE — 97140 MANUAL THERAPY 1/> REGIONS: CPT

## 2020-03-18 PROCEDURE — 97110 THERAPEUTIC EXERCISES: CPT

## 2020-03-18 NOTE — PROGRESS NOTES
PHYSICAL THERAPY - DAILY TREATMENT NOTE    Patient Name: Julius Lockwood        Date: 3/18/2020  : 1965   yes Patient  Verified  Visit #:   3     Insurance: Payor: Lin Romo / Plan: [facility] PPO / Product Type: PPO /      In time: 4:33 Out time:  5:27   Total Treatment Time: 54     Medicare/[employer] Time Tracking (below)   Total Timed Codes (min):  34 1:1 Treatment Time:  34     TREATMENT AREA =  Right shoulder pain [M25.511]    SUBJECTIVE  Pain Level (on 0 to 10 scale):  7 / 10   Medication Changes/New allergies or changes in medical history, any new surgeries or procedures?    no  If yes, update Summary List   Subjective Functional Status/Changes:  []  No changes reported     Patient reports more pain because she had stitches removed. Also reports \"doctor told me I could start using this arm but not moving it too much over my head.  I could also leave it supported on the couch when I'm not in the sling\"       OBJECTIVE  Modalities Rationale:     decrease inflammation and decrease pain to improve patient's ability to perform functional ADL's   min [] Estim, type/location:                                      []  att     []  unatt     []  w/US     []  w/ice    []  w/heat    min []  Mechanical Traction: type/lbs                   []  pro   []  sup   []  int   []  cont    []  before manual    []  after manual    min []  Ultrasound, settings/location:      min []  Iontophoresis w/ dexamethasone, location:                                               []  take home patch       []  in clinic   21 Min [x]  Ice     [x]  Heat    location/position: 10' MHP pre tx and 10' CP post tx in Semireclined to the R shoulder    min []  Vasopneumatic Device, press/temp:     min []  Other:    [] Skin assessment post-treatment (if applicable):    []  intact    []  redness- no adverse reaction     []redness - adverse reaction:          20 min Therapeutic Exercise:  [x]  See flow sheet   Rationale: increase ROM and increase strength to improve the patients ability to perform dressing activities     14 min Manual Therapy: R GHJ and elbow PROM within MD protocol    Rationale:      decrease pain and increase ROM to improve patient's PROM>AAROM>AROM within MD protocol. Billed With/As:   [x] TE   [] TA   [] Neuro   [] Self Care Patient Education: [x] Review HEP    [] Progressed/Changed HEP based on:   [] positioning   [] body mechanics   [] transfers   [] heat/ice application    [] other:      Other Objective/Functional Measures:    1:1 TE = 20'     R shoulder PROM in supine: flex 100 deg, ER/IR (arm abd at 45 deg) 50/45 deg  R elbow PROM ext: 10 deg from neutral   Presented with steristrips over clean portals     Post Treatment Pain Level (on 0 to 10) scale:  5  / 10     ASSESSMENT  Assessment/Changes in Function:     Educated patient on MD post surgical protocol per patient's subjective statement. Patient verbalized understanding     []  See Progress Note/Recertification   Patient will continue to benefit from skilled PT services to modify and progress therapeutic interventions, address functional mobility deficits, address ROM deficits, address strength deficits, analyze and address soft tissue restrictions, analyze and cue movement patterns, analyze and modify body mechanics/ergonomics and assess and modify postural abnormalities to attain remaining goals.    Progress toward goals / Updated goals:    Good progress towards STGs #1 and #3     PLAN  [x]  Upgrade activities as tolerated yes Continue plan of care   []  Discharge due to :    []  Other:      Therapist: JOHNIE Hatfield    Date: 3/18/2020 Time: 5:41 PM     Future Appointments   Date Time Provider Stephon Edouard   3/25/2020  4:30 PM [address] [facility], Hamzah Jim[facility]   3/27/2020  2:30 PM Hamzah Cowan [facility]   3/30/2020  4:30 PM Melchor, Devora Peabody [facility]   4/1/2020  4:30 PM Rosamaria Greene [facility]   4/7/2020  4:30 PM Kayla De La Rosa PT [facility]   4/9/2020  4:30 PM Andriy Myles, PT [facility]   4/14/2020  2:30 PM Dondra Self [facility]   4/16/2020  2:30 PM Dondra Self [facility]   4/20/2020  4:30 PM Merlene Cowan [facility]   4/22/2020  4:30 PM Merlene Cowan [facility]   4/27/2020  4:30 PM Merlene Cowan [facility]   4/29/2020  4:30 PM Aisha Carrington [facility]

## 2020-03-25 ENCOUNTER — APPOINTMENT (OUTPATIENT)
Dept: PHYSICAL THERAPY | Age: 55
End: 2020-03-25
Payer: COMMERCIAL

## 2020-03-25 ENCOUNTER — HOSPITAL ENCOUNTER (OUTPATIENT)
Dept: PHYSICAL THERAPY | Age: 55
Discharge: HOME | End: 2020-03-25
Payer: COMMERCIAL

## 2020-03-25 PROCEDURE — 97110 THERAPEUTIC EXERCISES: CPT

## 2020-03-25 PROCEDURE — 97140 MANUAL THERAPY 1/> REGIONS: CPT

## 2020-03-25 NOTE — PROGRESS NOTES
PHYSICAL THERAPY - DAILY TREATMENT NOTE    Patient Name: Lamar Perez        Date: 3/25/2020  : 1965   yes Patient  Verified  Visit #:   4     Insurance: Payor: Claudeen Lao / Plan: [facility] PPO / Product Type: PPO /      In time: 4:30 Out time: 5:25   Total Treatment Time: 55     Medicare/[facility] Time Tracking (below)   Total Timed Codes (min):  35 1:1 Treatment Time:  35     TREATMENT AREA =  Right shoulder pain [M25.511]    SUBJECTIVE  Pain Level (on 0 to 10 scale):  3 / 10   Medication Changes/New allergies or changes in medical history, any new surgeries or procedures?    no  If yes, update Summary List   Subjective Functional Status/Changes:  []  No changes reported     Patient reports feeling a little better today compared to previous PT sessions.         OBJECTIVE  Modalities Rationale:     decrease inflammation and decrease pain to improve patient's ability to perform functional ADL's   min [] Estim, type/location:                                      []  att     []  unatt     []  w/US     []  w/ice    []  w/heat    min []  Mechanical Traction: type/lbs                   []  pro   []  sup   []  int   []  cont    []  before manual    []  after manual    min []  Ultrasound, settings/location:      min []  Iontophoresis w/ dexamethasone, location:                                               []  take home patch       []  in clinic   21 Min [x]  Ice     [x]  Heat    location/position: 10' MHP pre tx and 10' CP post tx in Semireclined to the R shoulder    min []  Vasopneumatic Device, press/temp:     min []  Other:    [] Skin assessment post-treatment (if applicable):    []  intact    []  redness- no adverse reaction     []redness - adverse reaction:          20 min Therapeutic Exercise:  [x]  See flow sheet   Rationale:      increase ROM and increase strength to improve the patients ability to perform dressing activities     15 min Manual Therapy: R GHJ and elbow PROM within MD protocol    Rationale:      decrease pain and increase ROM to improve patient's PROM>AAROM>AROM within MD protocol. Billed With/As:   [x] TE   [] TA   [] Neuro   [] Self Care Patient Education: [x] Review HEP    [] Progressed/Changed HEP based on:   [] positioning   [] body mechanics   [] transfers   [] heat/ice application    [] other:      Other Objective/Functional Measures:    1:1 TE = 20'    Added AAROM flex and ER to improve shoulder mobility. R shoulder PROM: flex 125 deg, ER/IR with arm abd 45 deg: 15 deg/20 deg  R elbow PROM: flex full, ext 10 deg from full     Post Treatment Pain Level (on 0 to 10) scale:  2  / 10     ASSESSMENT  Assessment/Changes in Function:     Steady progress with current PT interventions indicated by reduced pain levels and improved ROM. []  See Progress Note/Recertification   Patient will continue to benefit from skilled PT services to modify and progress therapeutic interventions, address functional mobility deficits, address ROM deficits, address strength deficits, analyze and address soft tissue restrictions, analyze and cue movement patterns, analyze and modify body mechanics/ergonomics and assess and modify postural abnormalities to attain remaining goals. Progress toward goals / Updated goals: · Short Term Goals: To be accomplished in  6  treatments:  1. Independent/compliant with long term HEP for shoulder PROM  2. Pt will achieve 130 degrees shoulder flexion passively to improve functional arm mobility. Progressing 03/25; 125 deg   3. Pt will be able to I don/doff sling in order to perform HEP as well as as bathing/dressing met 03/25  · Long Term Goals: To be accomplished in  12  treatments:  1.  Pt will progress towards AAROM/AROM in order to progress with functional ADL's.  2. Pt will be able to achieve 50-60 degrees ER passively in order to improve functional arm mobility.   3. Pt will report max pain <4/10 in order to improve functional ADL's PLAN  [x]  Upgrade activities as tolerated yes Continue plan of care   []  Discharge due to :    []  Other:      Therapist: JOHNIE Talavera    Date: 3/25/2020 Time: 5:33 PM     Future Appointments   Date Time Provider Stephon Edouard   3/25/2020  4:30 PM Chapo, [facility]   3/27/2020  2:30 PM Lilliana Cowan [facility]   3/30/2020  4:30 PM Chapo, [facility]   4/1/2020  4:30 PM [facility]   4/7/2020  4:30 PM Milton De La Rosa, PT [facility]   4/9/2020  4:30 PM Clary Barrios, PT [facility]   4/14/2020  2:30 PM [facility]   4/16/2020  2:30 PM [facility]   4/20/2020  4:30 PM Lilliana Cowan [facility]   [facility]22/2020  [facility]   4/27/2020  4:30 PM Lilliana Cowan [facility]   4/29/2020  4:30 PM [facility]

## 2020-03-27 ENCOUNTER — HOSPITAL ENCOUNTER (OUTPATIENT)
Dept: PHYSICAL THERAPY | Age: 55
Discharge: HOME | End: 2020-03-27
Payer: COMMERCIAL

## 2020-03-27 ENCOUNTER — APPOINTMENT (OUTPATIENT)
Dept: PHYSICAL THERAPY | Age: 55
End: 2020-03-27
Payer: COMMERCIAL

## 2020-03-27 PROCEDURE — 97140 MANUAL THERAPY 1/> REGIONS: CPT

## 2020-03-27 PROCEDURE — 97110 THERAPEUTIC EXERCISES: CPT

## 2020-03-27 NOTE — PROGRESS NOTES
PHYSICAL THERAPY - DAILY TREATMENT NOTE    Patient Name: Sherri Ochoa        Date: 3/27/2020  : 1965   yes Patient  Verified  Visit #:   5     Insurance: Payor: Vitor Lancaster / Plan: [facility] PPO / Product Type: PPO /      In time: 2:03 Out time: 3:03   Total Treatment Time: 60     Medicare/BCBS Time Tracking (below)   Total Timed Codes (min):  40 1:1 Treatment Time:  40     TREATMENT AREA =  Right shoulder pain [M25.511]    SUBJECTIVE  Pain Level (on 0 to 10 scale):  2-3 / 10   Medication Changes/New allergies or changes in medical history, any new surgeries or procedures?    no  If yes, update Summary List   Subjective Functional Status/Changes:  []  No changes reported     Patient reports not feeling too bad today, just some soreness in the biceps (pointing to distal area) and in the back of the arm (pointing to post cuff)       OBJECTIVE  Modalities Rationale:     decrease inflammation and decrease pain to improve patient's ability to perform functional ADL's   min [] Estim, type/location:                                      []  att     []  unatt     []  w/US     []  w/ice    []  w/heat    min []  Mechanical Traction: type/lbs                   []  pro   []  sup   []  int   []  cont    []  before manual    []  after manual    min []  Ultrasound, settings/location:      min []  Iontophoresis w/ dexamethasone, location:                                               []  take home patch       []  in clinic   10 Min [x]  Ice     [x]  Heat    location/position: 10' MHP pre tx and 10' CP post tx in Semireclined to the R shoulder    min []  Vasopneumatic Device, press/temp:     min []  Other:    [] Skin assessment post-treatment (if applicable):    []  intact    []  redness- no adverse reaction     []redness - adverse reaction:          25 min Therapeutic Exercise:  [x]  See flow sheet   Rationale:      increase ROM and increase strength to improve the patients ability to perform dressing activities     15 min Manual Therapy: STM to R post cuff; R GHJ and elbow PROM within MD protocol    Rationale:      decrease pain and increase ROM to improve patient's PROM>AAROM>AROM within MD protocol. Billed With/As:   [x] TE   [] TA   [] Neuro   [] Self Care Patient Education: [x] Review HEP    [] Progressed/Changed HEP based on:   [] positioning   [] body mechanics   [] transfers   [] heat/ice application    [] other:      Other Objective/Functional Measures:    1:1 TE = 25'    Added pulleys and increase repetitions with cane flex to improve shoulder mobility    Increase tenderness around posterior portal and lateral portal sites. Post Treatment Pain Level (on 0 to 10) scale:  0 / 10     ASSESSMENT  Assessment/Changes in Function:     Patient noted no pain level following PT session indicating good tolerance with today's PT interventions. []  See Progress Note/Recertification   Patient will continue to benefit from skilled PT services to modify and progress therapeutic interventions, address functional mobility deficits, address ROM deficits, address strength deficits, analyze and address soft tissue restrictions, analyze and cue movement patterns, analyze and modify body mechanics/ergonomics and assess and modify postural abnormalities to attain remaining goals. Progress toward goals / Updated goals: · Short Term Goals: To be accomplished in  6  treatments:  1. Independent/compliant with long term HEP for shoulder PROM  2. Pt will achieve 130 degrees shoulder flexion passively to improve functional arm mobility. Progressing 03/25; 125 deg   3. Pt will be able to I don/doff sling in order to perform HEP as well as as bathing/dressing met 03/25  · Long Term Goals: To be accomplished in  12  treatments:  1.  Pt will progress towards AAROM/AROM in order to progress with functional ADL's.  2. Pt will be able to achieve 50-60 degrees ER passively in order to improve functional arm mobility.   3. Pt will report max pain <4/10 in order to improve functional ADL's     PLAN  [x]  Upgrade activities as tolerated yes Continue plan of care   []  Discharge due to :    []  Other:      Therapist: JOHNIE Johnson    Date: 3/27/2020 Time: 3:11 PM     Future Appointments   Date Time Provider Stephon Edouard   3/27/2020  2:30 PM Mamadou Damico [facility]   3/31/2020  1:00 PM Erin Cowan [facility]   4/2/2020  3:00 PM Elisha Cowan [facility]   4/7/2020  3:00 PM Erin Cowan [facility]   4/9/2020  3:00 PM Erin Cowan [facility]   4/14/2020  3:00 PM Chapo Erin [facility]   4/16/2020  3:00 PM Neil Sykes [facility]   4/20/2020  2:00 PM Nba Bledsoe, PT [facility]   4/22/2020  4:00 PM Leonie Record [facility]   4/27/2020  2:00 PM Leonie Record [facility]   4/29/2020  4:00 PM Hartzog, Rosina Shone, PT [facility]

## 2020-03-30 ENCOUNTER — APPOINTMENT (OUTPATIENT)
Dept: PHYSICAL THERAPY | Age: 55
End: 2020-03-30
Payer: COMMERCIAL

## 2020-03-31 ENCOUNTER — HOSPITAL ENCOUNTER (OUTPATIENT)
Dept: PHYSICAL THERAPY | Age: 55
Discharge: HOME | End: 2020-03-31
Payer: COMMERCIAL

## 2020-03-31 PROCEDURE — 97110 THERAPEUTIC EXERCISES: CPT

## 2020-03-31 PROCEDURE — 97140 MANUAL THERAPY 1/> REGIONS: CPT

## 2020-03-31 NOTE — PROGRESS NOTES
PHYSICAL THERAPY - DAILY TREATMENT NOTE    Patient Name: Philippe Whitehead        Date: 3/31/2020  : 1965   yes Patient  Verified  Visit #:   6     Insurance: Payor: Missy Martinez / Plan: [facility] PPO / Product Type: PPO /      In time: 12:54 Out time: 1:58   Total Treatment Time: 64     Medicare/BCBS Time Tracking (below)   Total Timed Codes (min):  44 1:1 Treatment Time:  44     TREATMENT AREA =  Right shoulder pain [M25.511]    SUBJECTIVE  Pain Level (on 0 to 10 scale): 4 / 10   Medication Changes/New allergies or changes in medical history, any new surgeries or procedures?    no  If yes, update Summary List   Subjective Functional Status/Changes:  []  No changes reported     Patient reports having elevated pain beginning Saturday afternoon until today. States that it's primarily in the posterior portal site and \"stabbing\" pain in lateral shoulder.          OBJECTIVE  Modalities Rationale:     decrease inflammation and decrease pain to improve patient's ability to perform functional ADL's   min [] Estim, type/location:                                      []  att     []  unatt     []  w/US     []  w/ice    []  w/heat    min []  Mechanical Traction: type/lbs                   []  pro   []  sup   []  int   []  cont    []  before manual    []  after manual    min []  Ultrasound, settings/location:      min []  Iontophoresis w/ dexamethasone, location:                                               []  take home patch       []  in clinic   21 Min [x]  Ice     [x]  Heat    location/position: 10' MHP pre tx and 10' CP post tx in Semireclined to the R shoulder    min []  Vasopneumatic Device, press/temp:     min []  Other:    [] Skin assessment post-treatment (if applicable):    []  intact    []  redness- no adverse reaction     []redness - adverse reaction:          29 min Therapeutic Exercise:  [x]  See flow sheet   Rationale:      increase ROM and increase strength to improve the patients ability to perform dressing activities     15 min Manual Therapy: STM to R post cuff; R GHJ and elbow PROM within MD protocol    Rationale:      decrease pain and increase ROM to improve patient's PROM>AAROM>AROM within MD protocol. Billed With/As:   [x] TE   [] TA   [] Neuro   [] Self Care Patient Education: [x] Review HEP    [] Progressed/Changed HEP based on:   [] positioning   [] body mechanics   [] transfers   [] heat/ice application    [] other:      Other Objective/Functional Measures:    1:1 TE = 29'    R shoulder PROM flex: 130 deg. No signs of infection or red flags around portal sites. No steristrips on posterior portal sites, lateral and anterior steristrips in tact. Added shoulder rolls to improve shoulder mobility. Post Treatment Pain Level (on 0 to 10) scale:  1-2 / 10     ASSESSMENT  Assessment/Changes in Function:     Patient making steady progress with current PT interventions noted by reduced pain levels and improved ROM. Encouraged pt to perform shoulder rolls and reduce sling during sitting activities (with arm supported). Patient acknowledged understanding. []  See Progress Note/Recertification   Patient will continue to benefit from skilled PT services to modify and progress therapeutic interventions, address functional mobility deficits, address ROM deficits, address strength deficits, analyze and address soft tissue restrictions, analyze and cue movement patterns, analyze and modify body mechanics/ergonomics and assess and modify postural abnormalities to attain remaining goals. Progress toward goals / Updated goals: · Short Term Goals: To be accomplished in  6  treatments:  1. Independent/compliant with long term HEP for shoulder PROM  2. Pt will achieve 130 degrees shoulder flexion passively to improve functional arm mobility. Met 03/31; 130 deg    3.  Pt will be able to I don/doff sling in order to perform HEP as well as as bathing/dressing met 03/25    · Long Term Goals: To be accomplished in  12  treatments:  1.  Pt will progress towards AAROM/AROM in order to progress with functional ADL's.  2. Pt will be able to achieve 50-60 degrees ER passively in order to improve functional arm mobility.   3. Pt will report max pain <4/10 in order to improve functional ADL's     PLAN  [x]  Upgrade activities as tolerated yes Continue plan of care   []  Discharge due to :    []  Other:      Therapist: JOHNIE Cavazos    Date: 3/31/2020 Time: 2:36 PM     Future Appointments   Date Time Provider Stephon Edouard   3/31/2020  1:00 PM [address] [place] [address]   4/2/2020  3:00 PM Chapo [facility]   4/7/2020  3:00 PM Chapo [facility]   4/9/2020  3:00 PM Chapo [facility]   4/14/2020  3:00 PM Chapo [facility]   4/16/2020  3:00 PM [address] [place] [address]   4/20/2020  2:00 PM Ann Morin, PT [facility]   4/22/2020  4:00 PM Holli Shone [facility]   4/27/2020  2:00 PM Holli Shone [facility]   4/29/2020  4:00 PM Maria Isabel De La Rosa, PT [facility]

## 2020-04-01 ENCOUNTER — APPOINTMENT (OUTPATIENT)
Dept: PHYSICAL THERAPY | Age: 55
End: 2020-04-01
Payer: COMMERCIAL

## 2020-04-02 ENCOUNTER — HOSPITAL ENCOUNTER (OUTPATIENT)
Dept: PHYSICAL THERAPY | Age: 55
Discharge: HOME | End: 2020-04-02
Payer: COMMERCIAL

## 2020-04-02 PROCEDURE — 97140 MANUAL THERAPY 1/> REGIONS: CPT

## 2020-04-02 PROCEDURE — 97110 THERAPEUTIC EXERCISES: CPT
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
## 2020-04-07 ENCOUNTER — APPOINTMENT (OUTPATIENT)
Dept: PHYSICAL THERAPY | Age: 55
End: 2020-04-07
Payer: COMMERCIAL

## 2020-04-07 ENCOUNTER — HOSPITAL ENCOUNTER (OUTPATIENT)
Dept: PHYSICAL THERAPY | Age: 55
Discharge: HOME | End: 2020-04-07
Payer: COMMERCIAL

## 2020-04-07 PROCEDURE — 97140 MANUAL THERAPY 1/> REGIONS: CPT

## 2020-04-07 PROCEDURE — 97110 THERAPEUTIC EXERCISES: CPT

## 2020-04-07 NOTE — PROGRESS NOTES
PHYSICAL THERAPY - DAILY TREATMENT NOTE    Patient Name: Ashlee Bartholomew        Date: 2020  : 1965   yes Patient  Verified  Visit #:   8     Insurance: Payor: Elina Husain / Plan: [facility] PPO / Product Type: PPO /      In time: 3:01 Out time: 3:55   Total Treatment Time: 54     Medicare/BCBS Time Tracking (below)   Total Timed Codes (min):  44 1:1 Treatment Time:  44     TREATMENT AREA =  Right shoulder pain [M25.511]    SUBJECTIVE  Pain Level (on 0 to 10 scale): 3 / 10   Medication Changes/New allergies or changes in medical history, any new surgeries or procedures?    no  If yes, update Summary List   Subjective Functional Status/Changes:  []  No changes reported     Patient reports getting sharp pain down the back of the arm when trying to raise her shoulder. States she has been massaging her shoulder with Vitamin E and icing throughout her day.         OBJECTIVE  Modalities Rationale:     decrease inflammation and decrease pain to improve patient's ability to perform functional ADL's   min [] Estim, type/location:                                      []  att     []  unatt     []  w/US     []  w/ice    []  w/heat    min []  Mechanical Traction: type/lbs                   []  pro   []  sup   []  int   []  cont    []  before manual    []  after manual    min []  Ultrasound, settings/location:      min []  Iontophoresis w/ dexamethasone, location:                                               []  take home patch       []  in clinic   10 Min [x]  Ice     []  Heat    location/position: To R shoulder pain in semi recline    min []  Vasopneumatic Device, press/temp:     min []  Other:    [] Skin assessment post-treatment (if applicable):    []  intact    []  redness- no adverse reaction     []redness - adverse reaction:          30 min Therapeutic Exercise:  [x]  See flow sheet   Rationale:      increase ROM and increase strength to improve the patients ability to perform dressing activities     14 min Manual Therapy: STM to R post cuff and R infra/teres minor; R GHJ and elbow PROM within MD protocol    Rationale:      decrease pain and increase ROM to improve patient's PROM>AAROM>AROM within MD protocol. Billed With/As:   [x] TE   [] TA   [] Neuro   [] Self Care Patient Education: [x] Review HEP    [] Progressed/Changed HEP based on:   [] positioning   [] body mechanics   [] transfers   [] heat/ice application    [] other:      Other Objective/Functional Measures:    1:1 TE = 30'    Progressed therex to improve R shoulder AAROM/AROM and elbow AROM (see flowsheet). Significant TTP and palpable trigger points in infraspinatus noted during MT. (+) wincing and jump sign     Post Treatment Pain Level (on 0 to 10) scale:  1 / 10     ASSESSMENT  Assessment/Changes in Function:     Patient progressing with ROM within MD protocol. Steady progress with shoulder AROM without compensation. []  See Progress Note/Recertification   Patient will continue to benefit from skilled PT services to modify and progress therapeutic interventions, address functional mobility deficits, address ROM deficits, address strength deficits, analyze and address soft tissue restrictions, analyze and cue movement patterns, analyze and modify body mechanics/ergonomics and assess and modify postural abnormalities to attain remaining goals. Progress toward goals / Updated goals:    Long Term Goals: To be accomplished in  12  treatments:  1. Pt will progress towards AAROM/AROM in order to progress with functional ADL's. Progressing 04/02  2. Pt will be able to achieve 50-60 degrees ER passively in order to improve functional arm mobility. progressing 04/02  3.  Pt will report max pain <4/10 in order to improve functional ADL's; progressing 04/02; max pain 4/10      PLAN  [x]  Upgrade activities as tolerated yes Continue plan of care   []  Discharge due to :    []  Other:      Therapist: JOHNIE Sullivan    Date: 4/7/2020 Time: 3:56 PM     Future Appointments   Date Time Provider Stephon Silke   4/7/2020  3:00 PM [facility]   4/9/2020  3:00 PM Chapo [facility]   4/14/2020  3:00 PM Chapo [facility]   4/16/2020  3:00 PM Berhane Barroso [facility]   4/20/2020  2:00 PM Alicia Miguel, PT [facility]   4/22/2020  4:00 PM Nikkie [facility]   4/27/2020  2:00 PM Nikkie [facility]   4/29/2020  4:00 PM Serenity De La Rosa, PT [facility]

## 2020-04-09 ENCOUNTER — HOSPITAL ENCOUNTER (OUTPATIENT)
Dept: PHYSICAL THERAPY | Age: 55
Discharge: HOME | End: 2020-04-09
Payer: COMMERCIAL

## 2020-04-09 ENCOUNTER — APPOINTMENT (OUTPATIENT)
Dept: PHYSICAL THERAPY | Age: 55
End: 2020-04-09
Payer: COMMERCIAL

## 2020-04-09 PROCEDURE — 97110 THERAPEUTIC EXERCISES: CPT

## 2020-04-09 PROCEDURE — 97140 MANUAL THERAPY 1/> REGIONS: CPT

## 2020-04-09 NOTE — PROGRESS NOTES
PHYSICAL THERAPY - DAILY TREATMENT NOTE    Patient Name: Mariela Lambert        Date: 2020  : 1965   yes Patient  Verified  Visit #:     Insurance: Payor: Onur Costello / Plan: [facility] PPO / Product Type: PPO /      In time: 2:58 Out time: 4:06   Total Treatment Time: 68     Medicare/BCBS Time Tracking (below)   Total Timed Codes (min):  58 1:1 Treatment Time:  58     TREATMENT AREA =  Right shoulder pain [M25.511]    SUBJECTIVE  Pain Level (on 0 to 10 scale): 3/ 10   Medication Changes/New allergies or changes in medical history, any new surgeries or procedures?    no  If yes, update Summary List   Subjective Functional Status/Changes:  []  No changes reported     Patient reports feeling better than she did yesterday. States that she was sitting at work doing computer work and demonstrated her arms near full extension. States her pain could have been a 10/10 (pointing to distal biceps) and called Dr. Meade Ivonne office to let them know. Patient spoke with Alberto Ibrahim and Alberto Ibrahim spoke with Angus Sood. Per pt, Angus Sood stated that it's likely due to being out of the sling and the strengthening exercises.         OBJECTIVE  Modalities Rationale:     decrease inflammation and decrease pain to improve patient's ability to perform functional ADL's   min [] Estim, type/location:                                      []  att     []  unatt     []  w/US     []  w/ice    []  w/heat    min []  Mechanical Traction: type/lbs                   []  pro   []  sup   []  int   []  cont    []  before manual    []  after manual    min []  Ultrasound, settings/location:      min []  Iontophoresis w/ dexamethasone, location:                                               []  take home patch       []  in clinic   10 Min [x]  Ice     []  Heat    location/position: To R shoulder pain in semi recline    min []  Vasopneumatic Device, press/temp:     min []  Other:    [] Skin assessment post-treatment (if applicable):    []  intact    []  redness- no adverse reaction     []redness - adverse reaction:          38 min Therapeutic Exercise:  [x]  See flow sheet   Rationale:      increase ROM and increase strength to improve the patients ability to perform dressing activities     20 min Manual Therapy: Biceps assessment; STM to R biceps; R GHJ and elbow PROM within MD protocol    Rationale:      decrease pain and increase ROM to improve patient's PROM>AAROM>AROM within MD protocol. Billed With/As:   [x] TE   [] TA   [] Neuro   [] Self Care Patient Education: [x] Review HEP    [] Progressed/Changed HEP based on:   [] positioning   [] body mechanics   [] transfers   [] heat/ice application    [] other:      Other Objective/Functional Measures:    1:1 TE = 45'    Examined R biceps per pt's subjective statement. Increase tenderness along biceps otherwise no signs of bruising or redness; biceps still intact. Educated pt on red flags during times with significant pain. Increase abd and shoulder hike with finger ladder. Post Treatment Pain Level (on 0 to 10) scale:  1.5 / 10     ASSESSMENT  Assessment/Changes in Function:     Patient noted reduced pain following PT session. Demonstrates occasional wincing and sharp inhales during therex, however pt notes more \"pulling\" vs sharp pain. Educated pt to notify therapist if therex becomes painful. Pt verbalized understanding. []  See Progress Note/Recertification   Patient will continue to benefit from skilled PT services to modify and progress therapeutic interventions, address functional mobility deficits, address ROM deficits, address strength deficits, analyze and address soft tissue restrictions, analyze and cue movement patterns, analyze and modify body mechanics/ergonomics and assess and modify postural abnormalities to attain remaining goals. Progress toward goals / Updated goals:    Long Term Goals: To be accomplished in  12  treatments:  1.  Pt will progress towards AAROM/AROM in order to progress with functional ADL's. Progressing 04/02  2. Pt will be able to achieve 50-60 degrees ER passively in order to improve functional arm mobility. progressing 04/02  3.  Pt will report max pain <4/10 in order to improve functional ADL's; progressing 04/02; max pain 4/10      PLAN  [x]  Upgrade activities as tolerated yes Continue plan of care   []  Discharge due to :    []  Other:      Therapist: JOHNIE Sow    Date: 4/9/2020 Time: 5:08 PM     Future Appointments   Date Time Provider Stephon Edouard   [facility]2020  [facility]   4/14/2020  3[facility]   [facility]   4/20/2020  2:00 PM Grady Rosa, PT [facility]   4/22/2020  4:00 PM [facility]   4/27/2020  2:00 PM [facility]   4/29/2020  4:00 PM Yanna De La Rosa, PT [facility]

## 2020-04-14 ENCOUNTER — HOSPITAL ENCOUNTER (OUTPATIENT)
Dept: PHYSICAL THERAPY | Age: 55
Discharge: HOME | End: 2020-04-14
Payer: COMMERCIAL

## 2020-04-14 ENCOUNTER — APPOINTMENT (OUTPATIENT)
Dept: PHYSICAL THERAPY | Age: 55
End: 2020-04-14
Payer: COMMERCIAL

## 2020-04-14 PROCEDURE — 97140 MANUAL THERAPY 1/> REGIONS: CPT

## 2020-04-14 PROCEDURE — 97110 THERAPEUTIC EXERCISES: CPT

## 2020-04-14 NOTE — PROGRESS NOTES
[address] [place] [address] PHYSICAL THERAPY  [address] [place] [address] 201,[facility], [facility] - Phone: [phone]  Fax: [phone]  PROGRESS NOTE  Patient Name: Mariela Lambert : 1965   Treatment/Medical Diagnosis: Right shoulder pain [M25.511]   Referral Source: Ofeliayang Gianni*     Date of Initial Visit: 20 Attended Visits: 10 Missed Visits: 0     SUMMARY OF TREATMENT  PT interventions have included manual therapy (PROM/STM within MD protocol), therapeutic exercises to improve shoulder and elbow PROM>AAROM>AROM, patient education, and HEP. Modalities to increase tissue extensibility and pain control. CURRENT STATUS  Patient has attended 9 f/u appts since initial eval on 20 and is currently 5 weeks post op. Patient has been making slow, but steady progress with PT interventions. In the last 2 weeks, min pain, avg pain 3/10, noted 1 instance of max pain 10/10 due to prolonged elbow extension with work activities. Current objective findings: R shoulder PROM: flex/scap 130 deg, ER 52 deg, IR 50 deg;   R shoulder AA/AROM: flex 121 deg p!/115 deg p!; functional ER top of head, functional IR to R glute;   R shoulder strength reduced in all directions; gross strength 3/5   R elbow P/AROM: flex 148 deg/140 deg, ext 0 deg/3 deg    Goal/Measure of Progress Goal Met? 1. Pt will progress towards AAROM/AROM in order to progress with functional ADL's. Status at last Eval: NT Current Status: See above progressing   2. Pt will be able to achieve 50-60 degrees ER passively in order to improve functional arm mobility. Status at last Eval: NT Current Status: 50 deg met   3. Pt will report max pain <4/10 in order to improve functional ADL's   Status at last Eval: 6/10 Current Status: 10/10 Not met     New Goals to be achieved in __8__  treatments:  1. Pt will progress towards AAROM/AROM in R shoulder and R elbow to regain functional reach for dressing activities. 2. Pt will report max pain <4/10 in order to improve tolerance with functional ADL's.   3. Pt to demo >/= to 4/5 in R shoulder strength to complete work duties and household activities. RECOMMENDATIONS  Pt would continue to benefit from skilled PT for 2x/wk for 8 treatments to continue to improve R shoulder and R elbow ROM, flexibility, and strength within MD protocol for functional ADLs and work duties. If you have any questions/comments please contact us directly at [phone]. Thank you for allowing us to assist in the care of your patient. Therapist Signature: JOHNIE Troy Date: 4/14/2020    Olvin Carpio, PT Time: 3:54 PM   NOTE TO PHYSICIAN:  PLEASE COMPLETE THE ORDERS BELOW AND FAX TO   [facility]: [phone]  If you are unable to process this request in 24 hours please contact our office: [phone]    ___ I have read the above report and request that my patient continue as recommended.   ___ I have read the above report and request that my patient continue therapy with the following changes/special instructions:_________________________________________________________   ___ I have read the above report and request that my patient be discharged from therapy.      Physician Signature:        Date:       Time:

## 2020-04-14 NOTE — PROGRESS NOTES
PHYSICAL THERAPY - DAILY TREATMENT NOTE    Patient Name: Molly Whittaker        Date: 2020  : 1965   yes Patient  Verified  Visit #:   10   of   18-24  Insurance: Payor: Shila Brito / Plan: [facility] PPO / Product Type: PPO /      In time: 3:00 Out time: 4:00   Total Treatment Time: 60     Medicare/BCBS Time Tracking (below)   Total Timed Codes (min):  50 1:1 Treatment Time:  50     TREATMENT AREA =  Right shoulder pain [M25.511]    SUBJECTIVE  Pain Level (on 0 to 10 scale): 2-2.5 / 10   Medication Changes/New allergies or changes in medical history, any new surgeries or procedures?    no  If yes, update Summary List   Subjective Functional Status/Changes:  []  No changes reported     SEE PN       OBJECTIVE  Modalities Rationale:     decrease inflammation and decrease pain to improve patient's ability to perform functional ADL's   min [] Estim, type/location:                                      []  att     []  unatt     []  w/US     []  w/ice    []  w/heat    min []  Mechanical Traction: type/lbs                   []  pro   []  sup   []  int   []  cont    []  before manual    []  after manual    min []  Ultrasound, settings/location:      min []  Iontophoresis w/ dexamethasone, location:                                               []  take home patch       []  in clinic   10 Min [x]  Ice     []  Heat    location/position: To R shoulder pain in semi recline    min []  Vasopneumatic Device, press/temp:     min []  Other:    [] Skin assessment post-treatment (if applicable):    []  intact    []  redness- no adverse reaction     []redness - adverse reaction:          35 min Therapeutic Exercise:  [x]  See flow sheet   Rationale:      increase ROM and increase strength to improve the patients ability to perform dressing activities     15 min Manual Therapy: Shoulder and elbow assessment; STM to R biceps; R GHJ and elbow PROM within MD protocol    Rationale:      decrease pain and increase ROM to improve patient's PROM>AAROM>AROM within MD protocol. Billed With/As:   [x] TE   [] TA   [] Neuro   [] Self Care Patient Education: [x] Review HEP    [] Progressed/Changed HEP based on:   [] positioning   [] body mechanics   [] transfers   [] heat/ice application    [] other:      Other Objective/Functional Measures:    1:1 TE = 35'    SEE PN     Post Treatment Pain Level (on 0 to 10) scale:  1-1.5 / 10     ASSESSMENT  Assessment/Changes in Function:     SEE PN     []  See Progress Note/Recertification   Patient will continue to benefit from skilled PT services to modify and progress therapeutic interventions, address functional mobility deficits, address ROM deficits, address strength deficits, analyze and address soft tissue restrictions, analyze and cue movement patterns, analyze and modify body mechanics/ergonomics and assess and modify postural abnormalities to attain remaining goals.    Progress toward goals / Updated goals:    SEE PN     PLAN  [x]  Upgrade activities as tolerated yes Continue plan of care   []  Discharge due to :    []  Other:      Therapist: JOHNIE Sung    Date: 4/14/2020 Time: 4:03 PM     Future Appointments   Date Time Provider Stephon Edouard   4/14/2020  3:00 PM Ariadne Cowan [facility]   4/16/2020  3:00 PM Chapa [facility]   4/20/2020  2:00 PM Buffy Diaz, PT [facility]   4/22/2020  4:00 PM Cali [facility]   4/27/2020  2:00 PM Cali [facility]   4/29/2020  4:00 PM Michelle De La Rosa, PT [facility]

## 2020-04-16 ENCOUNTER — APPOINTMENT (OUTPATIENT)
Dept: PHYSICAL THERAPY | Age: 55
End: 2020-04-16
Payer: COMMERCIAL

## 2020-04-16 ENCOUNTER — HOSPITAL ENCOUNTER (OUTPATIENT)
Dept: PHYSICAL THERAPY | Age: 55
Discharge: HOME | End: 2020-04-16
Payer: COMMERCIAL

## 2020-04-16 PROCEDURE — 97110 THERAPEUTIC EXERCISES: CPT

## 2020-04-16 PROCEDURE — 97140 MANUAL THERAPY 1/> REGIONS: CPT

## 2020-04-16 NOTE — PROGRESS NOTES
PHYSICAL THERAPY - DAILY TREATMENT NOTE    Patient Name: Shari Sultana        Date: 2020  : 1965   yes Patient  Verified  Visit #:     Insurance: Payor: Karel Anaya / Plan: Steffanie Santiago [id number] PPO / Product Type: PPO /      In time: 2:58 Out time: 4:10   Total Treatment Time: 72     Medicare/[facility] Time Tracking (below)   Total Timed Codes (min):  62 1:1 Treatment Time:  62     TREATMENT AREA =  Right shoulder pain [M25.511]    SUBJECTIVE  Pain Level (on 0 to 10 scale): 1 / 10   Medication Changes/New allergies or changes in medical history, any new surgeries or procedures?    no  If yes, update Summary List   Subjective Functional Status/Changes:  []  No changes reported     Patient reports a little less pain today because she's been doing more exercises at home, but feels like she's passing kidney stones.         OBJECTIVE  Modalities Rationale:     decrease inflammation and decrease pain to improve patient's ability to perform functional ADL's   min [] Estim, type/location:                                      []  att     []  unatt     []  w/US     []  w/ice    []  w/heat    min []  Mechanical Traction: type/lbs                   []  pro   []  sup   []  int   []  cont    []  before manual    []  after manual    min []  Ultrasound, settings/location:      min []  Iontophoresis w/ dexamethasone, location:                                               _  take home patch       _  in clinic   10 Min [x]  Ice     []  Heat    location/position: To R shoulder pain in supine with wedge post-session    min []  Vasopneumatic Device, press/temp:     min []  Other:    [] Skin assessment post-treatment (if applicable):    []  intact    []  redness- no adverse reaction     []redness - adverse reaction:          50 min Therapeutic Exercise:  [x]  See flow sheet   Rationale:      increase ROM and increase strength to improve the patients ability to perform dressing activities     12 min Manual Therapy: STM to R biceps and R periscapular mms; R GHJ and elbow PROM within MD protocol    Rationale:      decrease pain and increase ROM to improve patient's PROM>AAROM>AROM within MD protocol. Billed With/As:   [x] TE   [] TA   [] Neuro   [] Self Care Patient Education: [x] Review HEP    [] Progressed/Changed HEP based on:   [] positioning   [] body mechanics   [] transfers   [] heat/ice application    [] other:      Other Objective/Functional Measures:    1:1 TE = 50'    Progressed/added multiple therex to improve R shoulder flexibility and strength (see flowsheet)   Noted tenderness along lateral scapular border during MT. Post Treatment Pain Level (on 0 to 10) scale:  1-2/ 10     ASSESSMENT  Assessment/Changes in Function:     Patient noted end range \"pulling\" in infraspinatus with AAROM therex likely due to decrease muscular flexibility. Advised pt to continue to perform stretches at home to improve shoulder mobility/flexibility. []  See Progress Note/Recertification   Patient will continue to benefit from skilled PT services to modify and progress therapeutic interventions, address functional mobility deficits, address ROM deficits, address strength deficits, analyze and address soft tissue restrictions, analyze and cue movement patterns, analyze and modify body mechanics/ergonomics and assess and modify postural abnormalities to attain remaining goals. Progress toward goals / Updated goals:    New Goals to be achieved in __8__  treatments:  1. Pt will progress towards AAROM/AROM in R shoulder and R elbow to regain functional reach for dressing activities. progressing 04/16  2. Pt will report max pain <4/10 in order to improve tolerance with functional ADL's. progressing 04/16  3. Pt to demo >/= to 4/5 in R shoulder strength to complete work duties and household activities.       PLAN  [x]  Upgrade activities as tolerated yes Continue plan of care   []  Discharge due to :    []  Other: Therapist: JOHNIE Sung    Date: 4/16/2020 Time: 4:40 PM     Future Appointments   Date Time Provider Stephon Edouard   4/16/2020  3:00 PM Azalea Cowan [facility]   4/20/2020  2:00 PM Azalea Cowan [facility]   4/22/2020  3:00 PM Azalea Cowan [facility]   4/27/2020  2:00 PM Marycarmen Patel PT [facility]   4/29/2020  4:00 PM Marek Lewis [facility]

## 2020-04-20 ENCOUNTER — APPOINTMENT (OUTPATIENT)
Dept: PHYSICAL THERAPY | Age: 55
End: 2020-04-20
Payer: COMMERCIAL

## 2020-04-22 ENCOUNTER — HOSPITAL ENCOUNTER (OUTPATIENT)
Dept: PHYSICAL THERAPY | Age: 55
Discharge: HOME | End: 2020-04-22
Payer: COMMERCIAL

## 2020-04-22 ENCOUNTER — APPOINTMENT (OUTPATIENT)
Dept: PHYSICAL THERAPY | Age: 55
End: 2020-04-22
Payer: COMMERCIAL

## 2020-04-22 PROCEDURE — 97110 THERAPEUTIC EXERCISES: CPT

## 2020-04-22 PROCEDURE — 97016 VASOPNEUMATIC DEVICE THERAPY: CPT

## 2020-04-22 PROCEDURE — 97140 MANUAL THERAPY 1/> REGIONS: CPT

## 2020-04-22 NOTE — PROGRESS NOTES
PHYSICAL THERAPY - DAILY TREATMENT NOTE    Patient Name: Tarun Melo        Date: 2020  : 1965   yes Patient  Verified  Visit #:     Insurance: Payor: Dari Winchester / Plan: [facility] PPO / Product Type: PPO /      In time: 3:02 Out time: 4:01   Total Treatment Time: 59     Medicare/BCBS Time Tracking (below)   Total Timed Codes (min):  59 1:1 Treatment Time:  59     TREATMENT AREA =  Right shoulder pain [M25.511]    SUBJECTIVE  Pain Level (on 0 to 10 scale): 1 / 10   Medication Changes/New allergies or changes in medical history, any new surgeries or procedures?    no  If yes, update Summary List   Subjective Functional Status/Changes:  []  No changes reported     Patient reports having virtual visit with MD and per pt, MD stated they were happy with the progress and wants to progress towards strengthening. Also reports MD wrote rx for anti inflammatories to assist with the tenderness and swelling in the back of the arm.         OBJECTIVE  Modalities Rationale:     decrease inflammation and decrease pain to improve patient's ability to perform functional ADL's   min [] Estim, type/location:                                      []  att     []  unatt     []  w/US     []  w/ice    []  w/heat    min []  Mechanical Traction: type/lbs                   []  pro   []  sup   []  int   []  cont    []  before manual    []  after manual    min []  Ultrasound, settings/location:      min []  Iontophoresis w/ dexamethasone, location:                                               _  take home patch       _  in clinic    Min []  Ice     []  Heat    location/position:    8 Min [x]  Vasopneumatic Device, press/temp: MP/36 deg to R shoulder in longsit post-session    min []  Other:    [] Skin assessment post-treatment (if applicable):    []  intact    []  redness- no adverse reaction     []redness - adverse reaction:          37 min Therapeutic Exercise:  [x]  See flow sheet   Rationale: increase ROM and increase strength to improve the patients ability to perform dressing activities     12 min Manual Therapy: STM to R biceps and R periscapular mms; R GHJ and elbow PROM within MD protocol    Rationale:      decrease pain and increase ROM to improve patient's PROM>AAROM>AROM within MD protocol. Billed With/As:   [x] TE   [] TA   [] Neuro   [] Self Care Patient Education: [x] Review HEP    [] Progressed/Changed HEP based on:   [] positioning   [] body mechanics   [] transfers   [] heat/ice application    [] other:      Other Objective/Functional Measures:    1:1 TE = 35'    Progressed therex within MD protocol to improve scapular strength and shoulder stability (see flowsheet)   Significant tenderness along posterior cuff into posterior arm. Noted moderate swelling in posterior cuff area. Initiated Jam Floor post tx session to assist with swelling and pain. Post Treatment Pain Level (on 0 to 10) scale:  0-1/ 10     ASSESSMENT  Assessment/Changes in Function:     Patient demonstrated good tolerance with today's PT interventions indicated by reduced pain levels and decrease tenderness in post cuff. []  See Progress Note/Recertification   Patient will continue to benefit from skilled PT services to modify and progress therapeutic interventions, address functional mobility deficits, address ROM deficits, address strength deficits, analyze and address soft tissue restrictions, analyze and cue movement patterns, analyze and modify body mechanics/ergonomics and assess and modify postural abnormalities to attain remaining goals. Progress toward goals / Updated goals:    New Goals to be achieved in __8__  treatments:  1. Pt will progress towards AAROM/AROM in R shoulder and R elbow to regain functional reach for dressing activities. progressing 04/16  2. Pt will report max pain <4/10 in order to improve tolerance with functional ADL's. progressing 04/16  3.  Pt to demo >/= to 4/5 in R shoulder strength to complete work duties and household activities.  progressing 04/22     PLAN  [x]  Upgrade activities as tolerated yes Continue plan of care   []  Discharge due to :    []  Other:      Therapist: JOHNIE Bentley    Date: 4/22/2020 Time: 4:02 PM     Future Appointments   Date Time Provider Stephon Edouard   [facility]22[facility]2020  [facility]   4/27/2020  2:00 PM Zaid Daniels PT [facility]   4/29/2020  4:00 PM Norma Cowan [facility]   5/4/2020  3:00 PM Norma Cowan [facility]   5/6/2020  3:00 PM Norma Cowan [facility]   5/11/2020  3:00 PM Mehdi Dillard [facility]   5/13/2020  3:00 PM Lance López

## 2020-04-27 ENCOUNTER — HOSPITAL ENCOUNTER (OUTPATIENT)
Dept: PHYSICAL THERAPY | Age: 55
Discharge: HOME | End: 2020-04-27
Payer: COMMERCIAL

## 2020-04-27 ENCOUNTER — APPOINTMENT (OUTPATIENT)
Dept: PHYSICAL THERAPY | Age: 55
End: 2020-04-27
Payer: COMMERCIAL

## 2020-04-27 PROCEDURE — 97110 THERAPEUTIC EXERCISES: CPT

## 2020-04-27 PROCEDURE — 97140 MANUAL THERAPY 1/> REGIONS: CPT

## 2020-04-27 PROCEDURE — 97016 VASOPNEUMATIC DEVICE THERAPY: CPT

## 2020-04-27 NOTE — PROGRESS NOTES
PHYSICAL THERAPY - DAILY TREATMENT NOTE    Patient Name: Salty Ballard        Date: 2020  : 1965   yes Patient  Verified  Visit #:   15   of   18-24  Insurance: Payor: Florina Ambriz / Plan: [facility] PPO / Product Type: PPO /      In time: 1:58 Out time: 3:00   Total Treatment Time: 62     Medicare/BCBS Time Tracking (below)   Total Timed Codes (min):  62 1:1 Treatment Time:  62     TREATMENT AREA =  Right shoulder pain [M25.511]    SUBJECTIVE  Pain Level (on 0 to 10 scale): 3 / 10   Medication Changes/New allergies or changes in medical history, any new surgeries or procedures?    no  If yes, update Summary List   Subjective Functional Status/Changes:  []  No changes reported     Patient reports having some pain since LV, but also has been trying to sleep on the R shoulder and doing a little more at home.  Notes pain along lateral to post RTC areas (surrounding portal sites)         OBJECTIVE  Modalities Rationale:     decrease inflammation and decrease pain to improve patient's ability to perform functional ADL's   min [] Estim, type/location:                                      []  att     []  unatt     []  w/US     []  w/ice    []  w/heat    min []  Mechanical Traction: type/lbs                   []  pro   []  sup   []  int   []  cont    []  before manual    []  after manual    min []  Ultrasound, settings/location:      min []  Iontophoresis w/ dexamethasone, location:                                               _  take home patch       _  in clinic    Min []  Ice     []  Heat    location/position:    10 min [x]  Vasopneumatic Device, press/temp: LP/36 deg to R shoulder in longsit post-session    min []  Other:    [] Skin assessment post-treatment (if applicable):    []  intact    []  redness- no adverse reaction     []redness - adverse reaction:          40 min Therapeutic Exercise:  [x]  See flow sheet   Rationale:      increase ROM and increase strength to improve the patients ability to perform dressing activities     12 min Manual Therapy: STM to R biceps, R pec, and R post cuff; R GHJ and elbow PROM within MD protocol    Rationale:      decrease pain and increase ROM to improve patient's PROM>AAROM>AROM within MD protocol. Billed With/As:   [x] TE   [] TA   [] Neuro   [] Self Care Patient Education: [x] Review HEP    [] Progressed/Changed HEP based on:   [] positioning   [] body mechanics   [] transfers   [] heat/ice application    [] other:      Other Objective/Functional Measures:    1:1 TE = 40'    Increase shoulder ant tilt during PROM IR. Added corner stretch to decrease anterior shoulder tightness      Post Treatment Pain Level (on 0 to 10) scale:  2 */ 10     ASSESSMENT  Assessment/Changes in Function:     Patient demonstrated fair tolerance with PT interventions today. Continued to note posterolateral \"pulling\" during therex, however able to reduce range/muscle activation before onset of pain. []  See Progress Note/Recertification   Patient will continue to benefit from skilled PT services to modify and progress therapeutic interventions, address functional mobility deficits, address ROM deficits, address strength deficits, analyze and address soft tissue restrictions, analyze and cue movement patterns, analyze and modify body mechanics/ergonomics and assess and modify postural abnormalities to attain remaining goals. Progress toward goals / Updated goals:    New Goals to be achieved in __8__  treatments:  1. Pt will progress towards AAROM/AROM in R shoulder and R elbow to regain functional reach for dressing activities. progressing 04/27  2. Pt will report max pain <4/10 in order to improve tolerance with functional ADL's. progressing 04/27  3. Pt to demo >/= to 4/5 in R shoulder strength to complete work duties and household activities.  progressing 04/27     PLAN  [x]  Upgrade activities as tolerated yes Continue plan of care   []  Discharge due to : []  Other:      Therapist: JOHNIE Chong    Date: 4/27/2020 Time: 3:06 PM     Future Appointments   Date Time Provider Stephon Edouard   4/27/2020  2:00 PM Chapo Yazmin [facility]   4/29/2020  4:00 PM Chapo Yazmin [facility]   5/4/2020  3:00 PM Chapo Yazmin [facility]   5/6/2020  3:00 PM Chapo Yazmin [facility]   5/11/2020  3:00 PM [facility]   5/13/2020  3:00 PM [facility]

## 2020-04-29 ENCOUNTER — APPOINTMENT (OUTPATIENT)
Dept: PHYSICAL THERAPY | Age: 55
End: 2020-04-29
Payer: COMMERCIAL

## 2020-04-29 ENCOUNTER — HOSPITAL ENCOUNTER (OUTPATIENT)
Dept: PHYSICAL THERAPY | Age: 55
Discharge: HOME | End: 2020-04-29
Payer: COMMERCIAL

## 2020-04-29 PROCEDURE — 97110 THERAPEUTIC EXERCISES: CPT

## 2020-04-29 PROCEDURE — 97140 MANUAL THERAPY 1/> REGIONS: CPT

## 2020-04-29 NOTE — PROGRESS NOTES
PHYSICAL THERAPY - DAILY TREATMENT NOTE    Patient Name: Champ Rome        Date: 2020  : 1965   yes Patient  Verified  Visit #:     Insurance: Payor: Faye Walker / Plan: [facility] PPO / Product Type: PPO /      In time: 4:00 Out time: 4:50   Total Treatment Time: 50     Medicare/BCBS Time Tracking (below)   Total Timed Codes (min):  40 1:1 Treatment Time:  40     TREATMENT AREA =  Right shoulder pain [M25.511]    SUBJECTIVE  Pain Level (on 0 to 10 scale): 2.5 / 10   Medication Changes/New allergies or changes in medical history, any new surgeries or procedures?    no  If yes, update Summary List   Subjective Functional Status/Changes:  []  No changes reported     Patient reports constant pain in the back of her arm despite taking her anti inflammatories for a full week.        OBJECTIVE  Modalities Rationale:     decrease inflammation and decrease pain to improve patient's ability to perform functional ADL's   min [] Estim, type/location:                                      []  att     []  unatt     []  w/US     []  w/ice    []  w/heat    min []  Mechanical Traction: type/lbs                   []  pro   []  sup   []  int   []  cont    []  before manual    []  after manual    min []  Ultrasound, settings/location:      min []  Iontophoresis w/ dexamethasone, location:                                               _  take home patch       _  in clinic   10 Min [x]  Ice     []  Heat    Location/position: To R shoulder in supine with wedge post-session    min []  Vasopneumatic Device, press/temp:     min []  Other:    [] Skin assessment post-treatment (if applicable):    []  intact    []  redness- no adverse reaction     []redness - adverse reaction:          25 min Therapeutic Exercise:  [x]  See flow sheet   Rationale:      increase ROM and increase strength to improve the patients ability to perform dressing activities     15 min Manual Therapy: R shoulder assessment; STM to R biceps, R subscap, R infra/teres minor, and R post cuff; R GHJ and elbow PROM within MD protocol    Rationale:      decrease pain and increase ROM to improve patient's PROM>AAROM>AROM within MD protocol. Billed With/As:   [x] TE   [] TA   [] Neuro   [] Self Care Patient Education: [x] Review HEP    [] Progressed/Changed HEP based on:   [] positioning   [] body mechanics   [] transfers   [] heat/ice application    [] other:      Other Objective/Functional Measures:    1:1 TE = 25'    Significant tenderness with palpable trigger points in subscap and infra/teres minor. Noted swelling and redness in posterior upper arm. Educated pt on likelihood of compensatory strategies as well as referral pain from surrounding muscles or portal sites for pain. Reproduceable posterior arm pain with resisted ER and IR      Post Treatment Pain Level (on 0 to 10) scale:  1 / 10     ASSESSMENT  Assessment/Changes in Function:     Posterior arm pain likely referral pain from periscapular muscles. Educated and reviewed use of self massage techniques (ie: tennis ball) to reduce tightness and pain. []  See Progress Note/Recertification   Patient will continue to benefit from skilled PT services to modify and progress therapeutic interventions, address functional mobility deficits, address ROM deficits, address strength deficits, analyze and address soft tissue restrictions, analyze and cue movement patterns, analyze and modify body mechanics/ergonomics and assess and modify postural abnormalities to attain remaining goals. Progress toward goals / Updated goals:    New Goals to be achieved in __8__  treatments:  1. Pt will progress towards AAROM/AROM in R shoulder and R elbow to regain functional reach for dressing activities. progressing 04/27  2. Pt will report max pain <4/10 in order to improve tolerance with functional ADL's. progressing 04/27  3.  Pt to demo >/= to 4/5 in R shoulder strength to complete work duties and household activities.  progressing 04/27     PLAN  [x]  Upgrade activities as tolerated yes Continue plan of care   []  Discharge due to :    []  Other:      Therapist: JOHNIE Sung    Date: 4/29/2020 Time: 4:52 PM     Future Appointments   Date Time Provider Stephon Edouard   4/29/2020  4:00 PM [facility]   5/4/2020  3:00 PM Azalea Cowan [facility]   5/6/2020  3:00 PM Azalea Cowan [facility]   5/11/2020  3:00 PM [facility]   5/13/2020  3:00 PM Penny Qureshi

## 2020-05-04 ENCOUNTER — APPOINTMENT (OUTPATIENT)
Dept: PHYSICAL THERAPY | Age: 55
End: 2020-05-04
Payer: COMMERCIAL

## 2020-05-06 ENCOUNTER — HOSPITAL ENCOUNTER (OUTPATIENT)
Dept: PHYSICAL THERAPY | Age: 55
Discharge: HOME | End: 2020-05-06
Payer: COMMERCIAL

## 2020-05-06 PROCEDURE — 97110 THERAPEUTIC EXERCISES: CPT

## 2020-05-06 PROCEDURE — 97140 MANUAL THERAPY 1/> REGIONS: CPT

## 2020-05-06 NOTE — PROGRESS NOTES
PHYSICAL THERAPY - DAILY TREATMENT NOTE    Patient Name: Adam Both        Date: 2020  : 1965   yes Patient  Verified  Visit #:   15   of   18-24  Insurance: Payor: Low Jaquez / Plan: [facility] PPO / Product Type: PPO /      In time: 12:59 Out time: 2:05   Total Treatment Time: 66     Medicare/[facility] Time Tracking (below)   Total Timed Codes (min):  56 1:1 Treatment Time:  56     TREATMENT AREA =  Right shoulder pain [M25.511]    SUBJECTIVE  Pain Level (on 0 to 10 scale): 3 / 10   Medication Changes/New allergies or changes in medical history, any new surgeries or procedures?    no  If yes, update Summary List   Subjective Functional Status/Changes:  []  No changes reported     Patient reports getting new medication for 800 mg ibuprofen with Tylenol       OBJECTIVE  Modalities Rationale:     decrease inflammation and decrease pain to improve patient's ability to perform functional ADL's   min [] Estim, type/location:                                      []  att     []  unatt     []  w/US     []  w/ice    []  w/heat    min []  Mechanical Traction: type/lbs                   []  pro   []  sup   []  int   []  cont    []  before manual    []  after manual    min []  Ultrasound, settings/location:      min []  Iontophoresis w/ dexamethasone, location:                                               _  take home patch       _  in clinic   10 Min [x]  Ice     []  Heat    Location/position: To R shoulder in supine with wedge post-session    min []  Vasopneumatic Device, press/temp:     min []  Other:    [] Skin assessment post-treatment (if applicable):    []  intact    []  redness- no adverse reaction     []redness - adverse reaction:          40 min Therapeutic Exercise:  [x]  See flow sheet   Rationale:      increase ROM and increase strength to improve the patients ability to perform dressing activities     16 min Manual Therapy: STM to R biceps, R subscap, R infra/teres minor, and R post cuff; R GHJ and elbow PROM within MD protocol    Rationale:      decrease pain and increase ROM to improve patient's PROM>AAROM>AROM within MD protocol. Billed With/As:   [x] TE   [] TA   [] Neuro   [] Self Care Patient Education: [x] Review HEP    [] Progressed/Changed HEP based on:   [] positioning   [] body mechanics   [] transfers   [] heat/ice application    [] other:      Other Objective/Functional Measures:    1:1 TE = 40'    Pre-MT AROM: FIR R glute, DANIELLE top of head  Significant tenderness in SAS and subscap during MT. (+) wincing and jump sign upon palpation  Progressed to TB IR/ER (to neutral) and increase resistance with TB rows  to improve scap strength       Post Treatment Pain Level (on 0 to 10) scale:  2/ 10     ASSESSMENT  Assessment/Changes in Function:     Demonstrated improved AROM ER and IR following MT - achieved DANIELLE to C7 and FIR to L5/S1. []  See Progress Note/Recertification   Patient will continue to benefit from skilled PT services to modify and progress therapeutic interventions, address functional mobility deficits, address ROM deficits, address strength deficits, analyze and address soft tissue restrictions, analyze and cue movement patterns, analyze and modify body mechanics/ergonomics and assess and modify postural abnormalities to attain remaining goals. Progress toward goals / Updated goals:    New Goals to be achieved in __8__  treatments:  1. Pt will progress towards AAROM/AROM in R shoulder and R elbow to regain functional reach for dressing activities. progressing 05/06  2. Pt will report max pain <4/10 in order to improve tolerance with functional ADL's. progressing 04/27  3. Pt to demo >/= to 4/5 in R shoulder strength to complete work duties and household activities.  progressing 05/06     PLAN  [x]  Upgrade activities as tolerated yes Continue plan of care   []  Discharge due to :    []  Other:      Therapist: JOHNIE Morris    Date: 5/6/2020 Time: 2:08 PM Future Appointments   Date Time Provider Stephon Edouard   5/6/2020  1:00 PM Kevin Cowan [facility]   5/11/2020  3:00 PM Munira Monroe PT [facility]   [facility]  [facility]   5/18/2020  3:00 PM Desire [facility]   5/20/2020  1:00 PM Kevin Cowan [facility]   5/27/2020  1:00 PM Keily Chaidez [facility]

## 2020-05-11 ENCOUNTER — HOSPITAL ENCOUNTER (OUTPATIENT)
Dept: PHYSICAL THERAPY | Age: 55
Discharge: HOME | End: 2020-05-11
Payer: COMMERCIAL

## 2020-05-11 PROCEDURE — 97140 MANUAL THERAPY 1/> REGIONS: CPT | Performed by: PHYSICAL THERAPIST

## 2020-05-11 NOTE — PROGRESS NOTES
Naa Goodwin   PHYSICAL THERAPY - DAILY TREATMENT NOTE    Patient Name: Clint Driver        Date: 2020  : 1965   yes Patient  Verified  Visit #:     Insurance: Payor: Yuniel Paulson / Plan: [facility] PPO / Product Type: PPO /      In time: 300 Out time: 347   Total Treatment Time: 47     Medicare/BCBS Time Tracking (below)   Total Timed Codes (min):  37 1:1 Treatment Time:  37     TREATMENT AREA =  Right shoulder pain [M25.511]    SUBJECTIVE  Pain Level (on 0 to 10 scale):  4  / 10   Medication Changes/New allergies or changes in medical history, any new surgeries or procedures?    no  If yes, update Summary List   Subjective Functional Status/Changes:  []  No changes reported     Anytime I move my shoulder I get a lot of pain           OBJECTIVE  Modalities Rationale:     decrease inflammation, decrease pain and increase tissue extensibility to improve patient's ability to complete adls   min [] Estim, type/location:                                      []  att     []  unatt     []  w/US     []  w/ice    []  w/heat    min []  Mechanical Traction: type/lbs                   []  pro   []  sup   []  int   []  cont    []  before manual    []  after manual    min []  Ultrasound, settings/location:      min []  Iontophoresis w/ dexamethasone, location:                                               []  take home patch       []  in clinic   10 min [x]  Ice     []  Heat    location/position: Supine with bolster     min []  Vasopneumatic Device, press/temp:     min []  Other:    [x] Skin assessment post-treatment (if applicable):    [x]  intact    []  redness- no adverse reaction     []redness - adverse reaction:        37 min Manual Therapy: ghj prom pain free directions, stm post cuff, pec, grade I post mob, biomechanical assessment   Rationale:      decrease pain, increase ROM, increase tissue extensibility and decrease trigger points to improve patient's ability to complete adls      Billed With/As:   [] TE   [] TA   [] Neuro   [] Self Care Patient Education: [x] Review HEP    [] Progressed/Changed HEP based on:   [] positioning   [] body mechanics   [] transfers   [] heat/ice application    [] other:      Other Objective/Functional Measures:    Arrived to session with arom flex <90P!, er 35P!, FIR sacrum pain, full elbow arom pain free, +full can [employer] with test reproducing pt chief pain c/o  ttp along entire posterior cuff, unable to assess subscapularis due to pt guarding  Discussed with pt above findings and advised to perform pendulums only to avoid repetitive motion but maintain shoulder movement. Pt to also bring in prescribed meds next session to ensure she is following dosage     Post Treatment Pain Level (on 0 to 10) scale:   3  / 10     ASSESSMENT  Assessment/Changes in Function:     Sx consistent with shoulder impingement     []  See Progress Note/Recertification   Patient will continue to benefit from skilled PT services to address ROM deficits, address strength deficits, analyze and address soft tissue restrictions, analyze and cue movement patterns, analyze and modify body mechanics/ergonomics, assess and modify postural abnormalities and instruct in home and community integration to attain remaining goals. Progress toward goals / Updated goals:     Will assess pt response to treatment and hep modification to determine next step in poc     PLAN  []  Upgrade activities as tolerated yes Continue plan of care   []  Discharge due to :    []  Other:      Therapist: Ihsan Moreno PT    Date: 5/11/2020 Time: 3:41 PM     Future Appointments   Date Time Provider Stephon Edouard   5/13/2020  2:00 PM Nikkie Brooks [facility]   5/18/2020  3:00 PM Nikkie Brooks [facility]   5/20/2020  1:00 PM Berhane Barroso [facility]   5/27/2020  1:00 PM Camden Garrett

## 2020-05-13 ENCOUNTER — HOSPITAL ENCOUNTER (OUTPATIENT)
Dept: PHYSICAL THERAPY | Age: 55
Discharge: HOME | End: 2020-05-13
Payer: COMMERCIAL

## 2020-05-13 PROCEDURE — 97140 MANUAL THERAPY 1/> REGIONS: CPT | Performed by: PHYSICAL THERAPIST

## 2020-05-13 NOTE — PROGRESS NOTES
Jose Miguel Awan   PHYSICAL THERAPY - DAILY TREATMENT NOTE    Patient Name: Roney Barcenas        Date: 2020  : 1965   yes Patient  Verified  Visit #:     Insurance: Payor: Neo Overall / Plan: [facility] PPO / Product Type: PPO /      In time: 200 Out time: 245   Total Treatment Time: 45     Medicare/BCBS Time Tracking (below)   Total Timed Codes (min):  25 1:1 Treatment Time: 25     TREATMENT AREA =  Right shoulder pain [M25.511]    SUBJECTIVE  Pain Level (on 0 to 10 scale):  3  / 10   Medication Changes/New allergies or changes in medical history, any new surgeries or procedures?    no  If yes, update Summary List   Subjective Functional Status/Changes:  []  No changes reported     I actually looked at my prescription and I am suppose to take them every 6 hours so I am going ot stick to that        OBJECTIVE  Modalities Rationale:     decrease inflammation, decrease pain and increase tissue extensibility to improve patient's ability to complete adls   min [] Estim, type/location:                                      []  att     []  unatt     []  w/US     []  w/ice    []  w/heat    min []  Mechanical Traction: type/lbs                   []  pro   []  sup   []  int   []  cont    []  before manual    []  after manual    min []  Ultrasound, settings/location:      min []  Iontophoresis w/ dexamethasone, location:                                               []  take home patch       []  in clinic   10 min [x]  Ice     []  Heat    location/position: Supine with bolster     min []  Vasopneumatic Device, press/temp:     min []  Other:    [x] Skin assessment post-treatment (if applicable):    [x]  intact    []  redness- no adverse reaction     []redness - adverse reaction:        25 min Manual Therapy: ghj prom pain free directions, stm post cuff, pec, grade I post mob,   Rationale:      decrease pain, increase ROM, increase tissue extensibility and decrease trigger points to improve patient's ability to complete adls      Billed With/As:   [] TE   [] TA   [] Neuro   [] Self Care Patient Education: [x] Review HEP    [] Progressed/Changed HEP based on:   [] positioning   [] body mechanics   [] transfers   [] heat/ice application    [] other:      Other Objective/Functional Measures: Mh, followed by mt and cp  Guarded for first several minutes of MT and once able to relax achieved 15 degrees more of ER. Flexion still limited to 90      Post Treatment Pain Level (on 0 to 10) scale:   3  / 10     ASSESSMENT  Assessment/Changes in Function:     No change in s/s following session      []  See Progress Note/Recertification   Patient will continue to benefit from skilled PT services to address ROM deficits, address strength deficits, analyze and address soft tissue restrictions, analyze and cue movement patterns, analyze and modify body mechanics/ergonomics, assess and modify postural abnormalities and instruct in home and community integration to attain remaining goals. Progress toward goals / Updated goals:     Will continue to closely monitor pt status with change in program and refer back to MD if no change in status      PLAN  []  Upgrade activities as tolerated yes Continue plan of care   []  Discharge due to :    []  Other:      Therapist: Isabela Hernandez, PT    Date: 5/13/2020 Time: 3:41 PM     Future Appointments   Date Time Provider Stephon Edouard   [facility]13[facility]2020  [facility]   [facility]2020  [facility]   5/20/2020  1:00 PM Vaibhav Malone [facility]   5/27/2020  1:00 PM Vaibhav Malone [address] [place] [address]

## 2020-05-18 ENCOUNTER — HOSPITAL ENCOUNTER (OUTPATIENT)
Dept: PHYSICAL THERAPY | Age: 55
Discharge: HOME | End: 2020-05-18
Payer: COMMERCIAL

## 2020-05-18 PROCEDURE — 97140 MANUAL THERAPY 1/> REGIONS: CPT | Performed by: PHYSICAL THERAPIST

## 2020-05-18 NOTE — PROGRESS NOTES
Francisco Javier Ryan   PHYSICAL THERAPY - DAILY TREATMENT NOTE    Patient Name: Katie Patel        Date: 2020  : 1965   yes Patient  Verified  Visit #:     Insurance: Payor: Wilver Conde / Plan: [facility] PPO / Product Type: PPO /      In time: 254 Out time: 337   Total Treatment Time: 41     Medicare/BCBS Time Tracking (below)   Total Timed Codes (min):  21 1:1 Treatment Time: 21     TREATMENT AREA =  Right shoulder pain [M25.511]    SUBJECTIVE  Pain Level (on 0 to 10 scale):  3  / 10   Medication Changes/New allergies or changes in medical history, any new surgeries or procedures?    no  If yes, update Summary List   Subjective Functional Status/Changes:  []  No changes reported     See pn        OBJECTIVE  Modalities Rationale:     decrease inflammation, decrease pain and increase tissue extensibility to improve patient's ability to complete adls   min [] Estim, type/location:                                      []  att     []  unatt     []  w/US     []  w/ice    []  w/heat    min []  Mechanical Traction: type/lbs                   []  pro   []  sup   []  int   []  cont    []  before manual    []  after manual    min []  Ultrasound, settings/location:      min []  Iontophoresis w/ dexamethasone, location:                                               []  take home patch       []  in clinic   10 min [x]  Ice     []  Heat    location/position: Supine with bolster     min []  Vasopneumatic Device, press/temp:     min []  Other:    [x] Skin assessment post-treatment (if applicable):    [x]  intact    []  redness- no adverse reaction     []redness - adverse reaction:        21 min Manual Therapy: ghj prom pain free directions, stm post cuff, pec, grade I post mob,   Rationale:      decrease pain, increase ROM, increase tissue extensibility and decrease trigger points to improve patient's ability to complete adls      Billed With/As:   [] TE   [] TA   [] Neuro   [] Self Care Patient Education: [x] Review HEP    [] Progressed/Changed HEP based on:   [] positioning   [] body mechanics   [] transfers   [] heat/ice application    [] other:      Other Objective/Functional Measures:  See pn      Post Treatment Pain Level (on 0 to 10) scale:   3  / 10     ASSESSMENT  Assessment/Changes in Function:     No change in s/s following session      []  See Progress Note/Recertification   Patient will continue to benefit from skilled PT services to address ROM deficits, address strength deficits, analyze and address soft tissue restrictions, analyze and cue movement patterns, analyze and modify body mechanics/ergonomics, assess and modify postural abnormalities and instruct in home and community integration to attain remaining goals.    Progress toward goals / Updated goals:    See pn      PLAN  []  Upgrade activities as tolerated yes Continue plan of care   []  Discharge due to :    []  Other:      Therapist: Wen Yost PT    Date: 5/18/2020 Time: 3:41 PM     Future Appointments   Date Time Provider Stephon Edouard   5/20/2020  1:00 PM Jennie ADHIKARI [facility]   5/27/2020  4:00 PM Horace Gomes

## 2020-05-18 NOTE — PROGRESS NOTES
[facility] PHYSICAL THERAPY  [address] [place] [address] Germania Woodall [facility] [address],Tara Coronel, [address] [facility] - Phone: [phone]  Fax: [phone]  PROGRESS NOTE  Patient Name: Artemus Schilder : 1965   Treatment/Medical Diagnosis: Right shoulder pain [M25.511]   Referral Source: Puja Quevedo*     Date of Initial Visit: 20 Attended Visits: 19 Missed Visits: 0     SUMMARY OF TREATMENT  PT interventions have included manual therapy (PROM/STM within MD protocol), therapeutic exercises to improve shoulder and elbow PROM>AAROM>AROM, patient education, and HEP. Modalities to increase tissue extensibility and pain control. CURRENT STATUS  Pt has made very little progress with her shoulder rom and pain since her last PN. Her pain is posterior lateral increasing with both active and passive motion of her shoulder. (arom/prom flex 89/93P!, FIR GT/sacrum, er 44/48P!) + [employer], resisted er and full can. Modification to exercises/adls and compliance to prescribed meds have not reduced pain. There does appear to be some impingement sx with RTC involvement. She has a f/u with your office on 20 and have advised her to discuss above with you    Goal/Measure of Progress Goal Met? 1. Pt will progress towards AAROM/AROM in order to progress with functional ADL's. Status at last Eval:  Current Status: See above progressing   2. Pt will be able to achieve 50-60 degrees ER passively in order to improve functional arm mobility. Status at last Eval: 50 Current Status: 48 deg met   3. Pt will report max pain <4/10 in order to improve functional ADL's   Status at last Eval: 10/10 Current Status: 10/10 Not met     New Goals to be achieved in __8__  treatments  Continue as above  RECOMMENDATIONS  Await md consult to determine next step in poc for pain management.  Continue therapy an additional 2x/4weeks at that time     If you have any questions/comments please contact us directly at [phone]. Thank you for allowing us to assist in the care of your patient. Therapist Signature: James Blackwell DPT, MTC  Date: 5/18/2020     Time: 3:54 PM   NOTE TO PHYSICIAN:  PLEASE COMPLETE THE ORDERS BELOW AND FAX TO   [facility]: [phone]  If you are unable to process this request in 24 hours please contact our office: [phone]    ___ I have read the above report and request that my patient continue as recommended.   ___ I have read the above report and request that my patient continue therapy with the following changes/special instructions:_________________________________________________________   ___ I have read the above report and request that my patient be discharged from therapy.      Physician Signature:        Date:       Time:

## 2020-05-20 ENCOUNTER — HOSPITAL ENCOUNTER (OUTPATIENT)
Dept: PHYSICAL THERAPY | Age: 55
Discharge: HOME | End: 2020-05-20
Payer: COMMERCIAL

## 2020-05-20 PROCEDURE — 97140 MANUAL THERAPY 1/> REGIONS: CPT

## 2020-05-20 NOTE — PROGRESS NOTES
[facility] PHYSICAL THERAPY - DAILY TREATMENT NOTE    Patient Name: Tamara Lemus        Date: 2020  : 1965   yes Patient  Verified  Visit #:     Insurance: Payor: Freddy Neal / Plan: [facility] PPO / Product Type: PPO /      In time: 1:00 Out time: 1:50   Total Treatment Time: 50     Medicare/[facility] Time Tracking (below)   Total Timed Codes (min):  30 1:1 Treatment Time: 30     TREATMENT AREA =  Right shoulder pain [M25.511]    SUBJECTIVE  Pain Level (on 0 to 10 scale):  3 / 10   Medication Changes/New allergies or changes in medical history, any new surgeries or procedures?    no  If yes, update Summary List   Subjective Functional Status/Changes:  []  No changes reported     Patient reports no new c/o R shoulder pain. Will f/u with MD in office on .         OBJECTIVE  Modalities Rationale:     decrease inflammation, decrease pain and increase tissue extensibility to improve patient's ability to complete adls   min [] Estim, type/location:                                      []  att     []  unatt     []  w/US     []  w/ice    []  w/heat    min []  Mechanical Traction: type/lbs                   []  pro   []  sup   []  int   []  cont    []  before manual    []  after manual    min []  Ultrasound, settings/location:      min []  Iontophoresis w/ dexamethasone, location:                                               []  take home patch       []  in clinic   20 min [x]  Ice     [x]  Heat    location/position: MHP (pre) and CP (post) in longsit     min []  Vasopneumatic Device, press/temp:     min []  Other:    [x] Skin assessment post-treatment (if applicable):    [x]  intact    []  redness- no adverse reaction     []redness - adverse reaction:        30 min Manual Therapy: ghj prom pain free directions, stm post cuff, pec, grade I post mob,   Rationale:      decrease pain, increase ROM, increase tissue extensibility and decrease trigger points to improve patient's ability to complete adls    Billed With/As:   [x] TE   [] TA   [] Neuro   [] Self Care Patient Education: [x] Review HEP    [] Progressed/Changed HEP based on:   [] positioning   [] body mechanics   [] transfers   [] heat/ice application    [] other:      Other Objective/Functional Measures:    Discussed holding PT until after MD f/u appt and schedule PT appts following MD appt. Pt acknowledged understanding of current POC. Supine R shoulder P/AROM: 115 deg no pain/90 deg p! Post Treatment Pain Level (on 0 to 10) scale:   2  / 10     ASSESSMENT  Assessment/Changes in Function:     Noted minimal reduction in pain level following PT session. []  See Progress Note/Recertification   Patient will continue to benefit from skilled PT services to address ROM deficits, address strength deficits, analyze and address soft tissue restrictions, analyze and cue movement patterns, analyze and modify body mechanics/ergonomics, assess and modify postural abnormalities and instruct in home and community integration to attain remaining goals.    Progress toward goals / Updated goals:    No significant progress towards PT goals today  Will resume PT interventions per MD recommendation       PLAN  []  Upgrade activities as tolerated yes Continue plan of care   []  Discharge due to :    []  Other:      Therapist: JOHNIE Bobby    Date: 5/20/2020 Time: 1:59 PM     Future Appointments   Date Time Provider Stephon Edouard   5/20/2020  1:00 PM Dmitry ADHIKARI [facility]   5/27/2020  4:00 PM Brittany Bentley

## 2020-05-27 ENCOUNTER — APPOINTMENT (OUTPATIENT)
Dept: PHYSICAL THERAPY | Age: 55
End: 2020-05-27
Payer: COMMERCIAL

## 2020-06-01 ENCOUNTER — APPOINTMENT (OUTPATIENT)
Dept: PHYSICAL THERAPY | Age: 55
End: 2020-06-01

## 2020-06-03 ENCOUNTER — HOSPITAL ENCOUNTER (OUTPATIENT)
Dept: PHYSICAL THERAPY | Age: 55
Discharge: HOME | End: 2020-06-03
Payer: COMMERCIAL

## 2020-06-03 ENCOUNTER — APPOINTMENT (OUTPATIENT)
Dept: PHYSICAL THERAPY | Age: 55
End: 2020-06-03

## 2020-06-03 PROCEDURE — 97110 THERAPEUTIC EXERCISES: CPT | Performed by: PHYSICAL THERAPIST

## 2020-06-03 NOTE — PROGRESS NOTES
[facility] PHYSICAL [facility]ERAPY - DAILY TREATMENT NOTE    Patient Name: Masood Due        Date: 6/3/2020  : 1965   yes Patient  Verified  Visit #:     Insurance: Payor: Eboni Oliva / Plan: [facility] PPO / Product Type: PPO /      In time: 130 Out time: 200   Total Treatment Time: 30     Medicare/[facility] Time Tracking (below)   Total Timed Codes (min):  20 1:1 Treatment Time:  20     TREATMENT AREA =  Right shoulder pain [M25.511]    SUBJECTIVE  Pain Level (on 0 to 10 scale):  4  / 10   Medication Changes/New allergies or changes in medical history, any new surgeries or procedures? yes  If yes, update Summary List   Subjective Functional Status/Changes:  []  No changes reported     I saw the PA on Monday - she gave me a lidocaine and cortisone injection and told me to work on PT.  I go back and see them on            OBJECTIVE  Modalities Rationale:     decrease inflammation, decrease pain and increase tissue extensibility to improve patient's ability to complete adls    min [] Estim, type/location:                                      []  att     []  unatt     []  w/US     []  w/ice    []  w/heat    min []  Mechanical Traction: type/lbs                   []  pro   []  sup   []  int   []  cont    []  before manual    []  after manual    min []  Ultrasound, settings/location:      min []  Iontophoresis w/ dexamethasone, location:                                               []  take home patch       []  in clinic   10 min [x]  Ice     []  Heat    location/position: Supine with bolster     min []  Vasopneumatic Device, press/temp:     min []  Other:    [x] Skin assessment post-treatment (if applicable):    [x]  intact    []  redness- no adverse reaction     []redness  adverse reaction:        15 min Therapeutic Exercise:  [x]  See flow sheet   Rationale:      increase ROM and increase strength to improve the patients ability to complete adls     nc min Manual Therapy: Biomechanical assessment   Rationale:      decrease pain, increase ROM, increase tissue extensibility and decrease trigger points to improve patient's ability to complete adls        Billed With/As:   [] TE   [] TA   [] Neuro   [] Self Care Patient Education: [x] Review HEP    [] Progressed/Changed HEP based on:   [] positioning   [] body mechanics   [] transfers   [] heat/ice application    [] other:      Other Objective/Functional Measures:    Arom: flex in sitting 80 degrees with visible distress and UT compensation with pain along posterior lateral shoulder during descend, supine 110 pain in similar location, prom 90 degrees with guarding, er 20 active/15 passive, FIR GT with pain, +do kenned full can, te as per flow sheet with emphasis on aarom      Post Treatment Pain Level (on 0 to 10) scale:   4  / 10     ASSESSMENT  Assessment/Changes in Function:     Pt continues with significant shoulder pain consistent with impingement possible rtc, held on mt as pt able to achieve better rom with exercise vs mt with therapist      []  See Progress Note/Recertification   Patient will continue to benefit from skilled PT services to modify and progress therapeutic interventions, address functional mobility deficits, address ROM deficits, address strength deficits, analyze and address soft tissue restrictions, analyze and cue movement patterns, analyze and modify body mechanics/ergonomics, assess and modify postural abnormalities and instruct in home and community integration to attain remaining goals. Progress toward goals / Updated goals:    Pt agreed to continue with therapy an additional month with sessions schedule with PTA as she was able to achieve increased rom with her.  If no change during that time period will request MRI to assess any new soft tissue trauma      PLAN  []  Upgrade activities as tolerated yes Continue plan of care   []  Discharge due to :    []  Other:      Therapist: Kathleen Sky, PT Date: 6/3/2020 Time: 3:51 PM     Future Appointments   Date Time Provider Stephon Ochoai   6/10/2020  3:45 PM Nick Cowan [address]   6/15/2020  3:45 PM Fredi Cowan [address] [facility] [address]   6/17/2020  3:45 PM Fredi Cowan [address] [facility] [address]   6/22/2020  3:00 PM Sheron Dandy [address]   6/24/2020  3:00 PM Nava Trevino [facility] [address]   6/29/2020  3:00 PM Elmer Redd

## 2020-06-08 ENCOUNTER — APPOINTMENT (OUTPATIENT)
Dept: PHYSICAL THERAPY | Age: 55
End: 2020-06-08

## 2020-06-08 ENCOUNTER — APPOINTMENT (OUTPATIENT)
Dept: PHYSICAL THERAPY | Age: 55
End: 2020-06-08
Payer: COMMERCIAL

## 2020-06-10 ENCOUNTER — APPOINTMENT (OUTPATIENT)
Dept: PHYSICAL THERAPY | Age: 55
End: 2020-06-10
Payer: COMMERCIAL

## 2020-06-10 ENCOUNTER — APPOINTMENT (OUTPATIENT)
Dept: PHYSICAL THERAPY | Age: 55
End: 2020-06-10

## 2020-06-17 ENCOUNTER — HOSPITAL ENCOUNTER (OUTPATIENT)
Dept: PHYSICAL THERAPY | Age: 55
Discharge: HOME | End: 2020-06-17
Payer: COMMERCIAL

## 2020-06-17 PROCEDURE — 97140 MANUAL THERAPY 1/> REGIONS: CPT

## 2020-06-17 PROCEDURE — 97110 THERAPEUTIC EXERCISES: CPT

## 2020-06-17 NOTE — PROGRESS NOTES
PHYSICAL THERAPY - DAILY TREATMENT NOTE    Patient Name: Jessi Valdivia        Date: 2020  : 1965   yes Patient  Verified  Visit #:     Insurance: Payor: Lisa Marino / Plan: [facility] PPO / Product Type: PPO /      In time: 3:37 Out time: 4:40   Total Treatment Time: 63     Medicare/[employer] Time Tracking (below)   Total Timed Codes (min):  53 1:1 Treatment Time:  53     TREATMENT AREA =  Right shoulder pain [M25.511]    SUBJECTIVE  Pain Level (on 0 to 10 scale): 7 / 10   Medication Changes/New allergies or changes in medical history, any new surgeries or procedures?    no  If yes, update Summary List   Subjective Functional Status/Changes:  []  No changes reported     SEE PN       OBJECTIVE  Modalities Rationale:     decrease inflammation and decrease pain to improve patient's ability to perform functional ADL's   min [] Estim, type/location:                                      []  att     []  unatt     []  w/US     []  w/ice    []  w/heat    min []  Mechanical Traction: type/lbs                   []  pro   []  sup   []  int   []  cont    []  before manual    []  after manual    min []  Ultrasound, settings/location:      min []  Iontophoresis w/ dexamethasone, location:                                               _  take home patch       _  in clinic   10 Min [x]  Ice     []  Heat    Location/position: To R shoulder in semi recline post-session    min []  Vasopneumatic Device, press/temp:     min []  Other:    [] Skin assessment post-treatment (if applicable):    []  intact    []  redness- no adverse reaction     []redness  adverse reaction:          18 min Therapeutic Exercise:  [x]  See flow sheet   Rationale:      increase ROM and increase strength to improve the patients ability to perform dressing activities     35 min Manual Therapy: R shoulder reassessment; R GHJ PROM in all directions; R GHJ mobs and scapular mobs to increase flex; STM/MFR to R post cuff, R periscapular mms;    Rationale:      decrease pain and increase ROM to improve patient's PROM>AAROM>AROM within MD protocol. Billed With/As:   [x] TE   [] TA   [] Neuro   [] Self Care Patient Education: [x] Review HEP    [] Progressed/Changed HEP based on:   [] positioning   [] body mechanics   [] transfers   [] heat/ice application    [] other:      Other Objective/Functional Measures:    1:1 TE = 18'    SEE PN     Post Treatment Pain Level (on 0 to 10) scale:  7 / 10     ASSESSMENT  Assessment/Changes in Function:     SEE PN     []  See Progress Note/Recertification   Patient will continue to benefit from skilled PT services to modify and progress therapeutic interventions, address functional mobility deficits, address ROM deficits, address strength deficits, analyze and address soft tissue restrictions, analyze and cue movement patterns, analyze and modify body mechanics/ergonomics and assess and modify postural abnormalities to attain remaining goals.    Progress toward goals / Updated goals:    Hold PT until MD F/U appt on 07/01  SEE PN     PLAN  [x]  Upgrade activities as tolerated yes Continue plan of care   []  Discharge due to :    []  Other:      Therapist: JOHNIE Villalobos    Date: 6/17/2020 Time: 4:46 PM     Future Appointments   Date Time Provider Stephon Edouard   6/17/2020  3:45 PM Cullen Cowan [facility]   6/22/2020  3:00 PM Kerline Cowan [facility]   6/24/2020  3:00 PM Fantasma York [facility]   6/29/2020  3:00 PM Van Hwang

## 2020-06-17 NOTE — PROGRESS NOTES
[address] [facility] THERAPY  [address],[facility], [address] [facility] - Phone: [phone]  Fax: [phone]  PROGRESS NOTE  Patient Name: Roney Barcenas : 1965   Treatment/Medical Diagnosis: Right shoulder pain [M25.511]   Referral Source: Robin Cuellar*     Date of Initial Visit: 20 Attended Visits: 21 Missed Visits: 1     SUMMARY OF TREATMENT  PT interventions have included manual therapy (PROM/STM within MD protocol), therapeutic exercises to improve shoulder and elbow PROM>AAROM>AROM, patient education, and HEP. Modalities to increase tissue extensibility and pain control.     CURRENT STATUS  Patient has made minimal to no progress since last PN on 20. There appears to be conflicting information in regards to possible incident contributing to abrupt pain/loss of motion. Denies any falls or red flags since last PN. Reports no changes in symptoms since receiving injection on  and continues to have max pain 10+/10. Continues to have significant posterior lateral pain upon palpation and movement. Modification with all ADLs to prevent pain and has self-D/C'd medication due to stomach irritation. Patient has been advised to hold PT until F/U with your office on 20 and discuss next step to address pain. Current R shoulder P/AROM: flex 123 deg/112 deg, FIR glute/sacrum, ER 50 deg/45 deg. All directions with significant muscle guarding and pain. Goal/Measure of Progress Goal Met? 1. Pt will progress towards AAROM/AROM in order to progress with functional ADL's. Status at last Eval: arom/prom flex 89/93P!, FIR GT/sacrum, er 44/48P! Current Status: See above Not met   2. Pt will be able to achieve 50-60 degrees ER passively in order to improve functional arm mobility. Status at last Eval: 48 deg Current Status: 55 deg met   3.   Pt will report max pain <4/10 in order to improve functional ADL's   Status at last Eval: 10/10 Current Status: 10+/10 Not met     New Goals to be achieved in __4__  treatments:  Continue with all unmet goals above    RECOMMENDATIONS  Other: Hold therapy and await MD recommendations for next step for pain management. Thank you for this referral.   If you have any questions/comments please contact us directly at [phone]. Thank you for allowing us to assist in the care of your patient. Therapist Signature: Magaly JETT Date: 6/19/2020     Time: 11:41 AM   NOTE TO PHYSICIAN:  PLEASE COMPLETE THE ORDERS BELOW AND FAX TO   [facility] Physical Therapy: [phone]  If you are unable to process this request in 24 hours please contact our office: [phone]    ___ I have read the above report and request that my patient continue as recommended.   ___ I have read the above report and request that my patient continue therapy with the following changes/special instructions:_________________________________________________________   ___ I have read the above report and request that my patient be discharged from therapy.      Physician Signature:        Date:       Time:

## 2020-06-22 ENCOUNTER — APPOINTMENT (OUTPATIENT)
Dept: PHYSICAL THERAPY | Age: 55
End: 2020-06-22
Payer: COMMERCIAL

## 2020-06-24 ENCOUNTER — APPOINTMENT (OUTPATIENT)
Dept: PHYSICAL THERAPY | Age: 55
End: 2020-06-24
Payer: COMMERCIAL

## 2020-06-29 ENCOUNTER — APPOINTMENT (OUTPATIENT)
Dept: PHYSICAL THERAPY | Age: 55
End: 2020-06-29
Payer: COMMERCIAL

## 2020-09-29 ENCOUNTER — HOSPITAL ENCOUNTER (OUTPATIENT)
Dept: PHYSICAL THERAPY | Age: 55
Discharge: HOME | End: 2020-09-29
Payer: COMMERCIAL

## 2020-09-29 PROCEDURE — 97161 PT EVAL LOW COMPLEX 20 MIN: CPT | Performed by: PHYSICAL THERAPIST

## 2020-09-29 PROCEDURE — 97535 SELF CARE MNGMENT TRAINING: CPT | Performed by: PHYSICAL THERAPIST

## 2020-09-29 NOTE — PROGRESS NOTES
[facility] PHYSICAL THERAPY - DAILY TREATMENT NOTE    Patient Name: Tammy Hardy        Date: 2020  : 1965   yes Patient  Verified  Visit #:     Insurance: Payor: Gallo Jones / Plan: [facility] PPO / Product Type: PPO /      In time: 530 Out time: 605   Total Treatment Time: 35     Medicare/[employer] Time Tracking (below)   Total Timed Codes (min):  10 1:1 Treatment Time:  10     TREATMENT AREA =  Right shoulder pain [M25.511]    SUBJECTIVE  Pain Level (on 0 to 10 scale):  3  / 10   Medication Changes/New allergies or changes in medical history, any new surgeries or procedures?    no  If yes, update Summary List   Subjective Functional Status/Changes:  []  No changes reported     See ie          OBJECTIVE      10 min Self Care: Activity modification, sleeping position, hep, poc   Rationale:    increase ROM and decrease pain to improve the patients ability to complete adls    Billed With/As:   [] TE   [] TA   [] Neuro   [] Self Care Patient Education: [x] Review HEP    [] Progressed/Changed HEP based on:   [] positioning   [] body mechanics   [] transfers   [] heat/ice application    [] other:      Other Objective/Functional Measures:    Demo verbal understanding to [place]  See ie     Post Treatment Pain Level (on 0 to 10) scale:   5  / 10     ASSESSMENT  Assessment/Changes in Function:     See ie     []  See Progress Note/Recertification   Patient will continue to benefit from skilled PT services to modify and progress therapeutic interventions, address functional mobility deficits, address ROM deficits, address strength deficits, analyze and address soft tissue restrictions, analyze and cue movement patterns, analyze and modify body mechanics/ergonomics, assess and modify postural abnormalities and instruct in home and community integration to attain remaining goals.    Progress toward goals / Updated goals:    See ie     PLAN  []  Upgrade activities as tolerated yes Continue plan of care []  Discharge due to :    []  Other:      Therapist: Uche Carlin, PT    Date: 9/29/2020 Time: 6:15 PM     Future Appointments   Date Time Provider Stephon Edouard   10/15/2020 10:00 AM Adeola Cowan [address]   10/21/2020  9:30 AM Fabio Cowani [address]   10/23/2020  9:30 AM Fabio Cowani [address]   10/28/2020  9:30 AM Elo De La Rosa, PT [address]
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
## 2020-10-15 ENCOUNTER — HOSPITAL ENCOUNTER (OUTPATIENT)
Dept: PHYSICAL THERAPY | Age: 55
Discharge: HOME | End: 2020-10-15
Payer: COMMERCIAL

## 2020-10-15 PROCEDURE — 97140 MANUAL THERAPY 1/> REGIONS: CPT

## 2020-10-15 NOTE — PROGRESS NOTES
[facility] PHYSICAL THERAPY - DAILY TREATMENT NOTE    Patient Name: oBris Mckeon        Date: 10/15/2020  : 1965   yes Patient  Verified  Visit #:   2   of   9  Insurance: Payor: BLUE CROSS / Plan: [facility] PPO / Product Type: PPO /      In time: 10:02 Out time: 10:36   Total Treatment Time: 34     Medicare/BCBS Time Tracking (below)   Total Timed Codes (min):  34 1:1 Treatment Time:  34     TREATMENT AREA =  Right shoulder pain [M25.511]    SUBJECTIVE  Pain Level (on 0 to 10 scale):  6  / 10   Medication Changes/New allergies or changes in medical history, any new surgeries or procedures?    no  If yes, update Summary List   Subjective Functional Status/Changes:  []  No changes reported     Patient reports no change since initial eval. States she has MD F/U appt on 10/28 but might change it to a later date since she won't have 4 weeks worth of PT. Per pt, the plan is to complete 4 weeks of PT, f/u with MD then get MRI of R shoulder. OBJECTIVE    4 min Therapeutic Exercise:  [x]  See flow sheet   Rationale:      increase ROM and increase strength to improve the patients ability to tolerate desk work activities     30 min Manual Therapy: STM to R post cuff, R UT/lev scap and R ;R GHJ PROM in all directions (flex <90 deg)   Rationale:      decrease pain, increase ROM and increase tissue extensibility to improve patient's ability to perform     Billed With/As:   [x] TE   [] TA   [] Neuro   [] Self Care Patient Education: [x] Review HEP    [] Progressed/Changed HEP based on:   [] positioning   [] body mechanics   [] transfers   [] heat/ice application    [] other:      Other Objective/Functional Measures:    *Sharp pain noted along SAS into biceps with GHJ PROM elevation at 90 deg. *All therex performed in neutral position to prevent exacerbation of symptoms. *Advised pt to call clinic every day to schedule more appts prior to MD f/u appt. Encouraged pt to use ice following session. Patient acknowledged understanding      Post Treatment Pain Level (on 0 to 10) scale:   6  / 10     ASSESSMENT  Assessment/Changes in Function:     Patient noted no change in pain level following PT session. []  See Progress Note/Recertification   Patient will continue to benefit from skilled PT services to modify and progress therapeutic interventions, address functional mobility deficits, address ROM deficits, address strength deficits, analyze and address soft tissue restrictions, analyze and cue movement patterns, analyze and modify body mechanics/ergonomics, assess and modify postural abnormalities and instruct in home and community integration to attain remaining goals.    Progress toward goals / Updated goals:    No significant progress towards PT goals      PLAN  [x]  Upgrade activities as tolerated yes Continue plan of care   []  Discharge due to :    []  Other:      Therapist: JOHNIE lBackburn    Date: 10/15/2020 Time: 12:00 PM     Future Appointments   Date Time Provider Stephon Edouard   10/15/2020 10:00 AM [facility]   10/21/2020  9:30 AM Xiomara Cowan [id number]   10/23/2020  9:30 AM Xiomara Cowan[address]   10/28/2020  9:30 AM Sonali De La Rosa, PT [address]

## 2020-10-21 ENCOUNTER — APPOINTMENT (OUTPATIENT)
Dept: PHYSICAL THERAPY | Age: 55
End: 2020-10-21
Payer: COMMERCIAL

## 2020-10-23 ENCOUNTER — HOSPITAL ENCOUNTER (OUTPATIENT)
Dept: PHYSICAL THERAPY | Age: 55
Discharge: HOME | End: 2020-10-23
Payer: COMMERCIAL

## 2020-10-23 PROCEDURE — 97140 MANUAL THERAPY 1/> REGIONS: CPT

## 2020-10-23 NOTE — PROGRESS NOTES
[facility] PHYSICAL THERAPY - DAILY TREATMENT NOTE    Patient Name: Yessy Kennedy        Date: 10/23/2020  : 1965   yes Patient  Verified  Visit #:   3   of   9  Insurance: Payor: Char Alexandre / Plan: [facility] [id number] PPO / Product Type: PPO /      In time: 9:30 Out time: 10:03   Total Treatment Time: 33     Medicare/[facility] Time Tracking (below)   Total Timed Codes (min):  33 1:1 Treatment Time:  33     TREATMENT AREA =  Right shoulder pain [M25.511]    SUBJECTIVE  Pain Level (on 0 to 10 scale):  6  / 10   Medication Changes/New allergies or changes in medical history, any new surgeries or procedures?    no  If yes, update Summary List   Subjective Functional Status/Changes:  []  No changes reported     Notices more pain on the outside of the shoulder when bringing arm down       OBJECTIVE    3 min Therapeutic Exercise:  [x]  See flow sheet   Rationale:      increase ROM and increase strength to improve the patients ability to tolerate desk work activities     30 min Manual Therapy: STM to R deltoid, R post cuff, R UT/lev scap;R GHJ PROM flex ~90 deg   Rationale:      decrease pain, increase ROM and increase tissue extensibility to improve patient's ability to perform     Billed With/As:   [x] TE   [] TA   [] Neuro   [] Self Care Patient Education: [x] Review HEP    [] Progressed/Changed HEP based on:   [] positioning   [] body mechanics   [] transfers   [] heat/ice application    [] other:      Other Objective/Functional Measures:    Significant pain along lateral deltoid with eccentric phase of elevation. Able to achieve 90 deg AROM flexion       Post Treatment Pain Level (on 0 to 10) scale:   / 10     ASSESSMENT  Assessment/Changes in Function:     Patient demonstrating no improvement with current PT program. Continues to have pain and difficulty with elevation >90 deg.        []  See Progress Note/Recertification   Patient will continue to benefit from skilled PT services to modify and progress therapeutic interventions, address functional mobility deficits, address ROM deficits, address strength deficits, analyze and address soft tissue restrictions, analyze and cue movement patterns, analyze and modify body mechanics/ergonomics, assess and modify postural abnormalities and instruct in home and community integration to attain remaining goals. Progress toward goals / Updated goals: · Short Term Goals: To be accomplished in  2  weeks:  1. Compliant with hep met 10/23  2. arom flex 120 in order to reach overhead  · Long Term Goals: To be accomplished in  4  weeks:  1.  Daily max pain </=7/10 in order to increase participation in adls  2. arom flex 133 to reach overhead  3. +3 on GROC in order to show functional improvement     PLAN  [x]  Upgrade activities as tolerated yes Continue plan of care   []  Discharge due to :    []  Other:      Therapist: JOHNIE Stoner    Date: 10/23/2020 Time: 11:53 AM     Future Appointments   Date Time Provider Stephon Edouard   10/23/2020  9:30 AM Jayden Cowan   10/28/2020  9:30 AM Abril De La Rosa, PT [id number]

## 2020-10-26 ENCOUNTER — HOSPITAL ENCOUNTER (OUTPATIENT)
Dept: PHYSICAL THERAPY | Age: 55
Discharge: HOME | End: 2020-10-26
Payer: COMMERCIAL

## 2020-10-26 PROCEDURE — 97140 MANUAL THERAPY 1/> REGIONS: CPT

## 2020-10-26 NOTE — PROGRESS NOTES
[address] [facility] PHYSICAL THERAPY  [address] [place] [address] [address] [facility] [address],[facility], 70 [facility] - Phone: [phone]  Fax: [phone]    Patient Name: Dagoberto Williamson : 1965   Treatment/Medical Diagnosis: Right shoulder pain [M25.511]   Referral Source: Madeleine Ying*     Date of Initial Visit: 20 Attended Visits: 4 Missed Visits: 0     SUMMARY OF TREATMENT  Patient has attended 4 PT sessions, including an initial eval for R shoulder pain. GAP in PT attendance from 20 to 10/15/20 due to schedule conflicts. PT interventions have included manual therapy, therapeutic exercises, and patient education to increase shoulder ROM and strength. CURRENT STATUS  Patient has made no significant progress with PT interventions for R shoulder pain. In the last 2 weeks, pain has ranged between 0-9/10. Notes elevated pain with reaching (ie: reaching into passenger seat), but notices it the most when coming back from reaching. Patient reports HEP compliance (pendulums, AAROM, and submax iso) and use of modalities to manage symptms. Patient is on hold from physical therapy at this time due to no progress and has been advised to follow up with your office. Current objective findings: (+) Nya Morelle and (+) full can; R shoulder PROM in supine: flex 140 deg, ER 50 deg (scap plane), IR 48 deg (scap plane); R shoulder AROM in sitting: flexion 109 deg, DANIELLE top of head, FIR to R PSIS. Notes all directions to be painful, increase tenderness and pain upon palpation of lateral and anterior deltoid    Goal/Measure of Progress Goal Met? 1.   Daily max pain </=7/10 in order to increase participation in adls   Status at last Eval: 10/10 Current Status: 9/10 progressing   2.  arom flex 140 to reach overhead   Status at last Eval: 109 deg Current Status: 109 deg no   3.  +3 on GROC in order to show functional improvement   Status at last Eval: n/a Current Status: no no RECOMMENDATIONS  Hold therapy and await physician's recommendations. Please advise. Thank you for this referral.   If you have any questions/comments please contact us directly at [phone]. Thank you for allowing us to assist in the care of your patient.     Therapist Signature: JOHNIE Barnett Date: 10/26/20     Time: 6:58 PM     Kapil Guthrie*

## 2020-10-26 NOTE — PROGRESS NOTES
[facility] PHYSICAL THERAPY - DAILY TREATMENT NOTE    Patient Name: Selin Kurtz        Date: 10/26/2020  : 1965   yes Patient  Verified  Visit #:   4   of   9  Insurance: Payor: Dolores Laresan / Plan: [facility] PPO / Product Type: PPO /      In time: 3:44 Out time: 4:23   Total Treatment Time: 39     Medicare/BCBS Time Tracking (below)   Total Timed Codes (min):  29 1:1 Treatment Time:  29     TREATMENT AREA =  Right shoulder pain [M25.511]    SUBJECTIVE  Pain Level (on 0 to 10 scale): 6 / 10   Medication Changes/New allergies or changes in medical history, any new surgeries or procedures?    no  If yes, update Summary List   Subjective Functional Status/Changes:  []  No changes reported     SEE PN       OBJECTIVE    Modalities Rationale:     decrease inflammation and decrease pain to improve patient's ability to perform functional ADLs.    min [] Estim, type/location:                                      []  att     []  unatt     []  w/US     []  w/ice    []  w/heat    min []  Mechanical Traction: type/lbs                   []  pro   []  sup   []  int   []  cont    []  before manual    []  after manual    min []  Ultrasound, settings/location:      min []  Iontophoresis w/ dexamethasone, location:                                               []  take home patch       []  in clinic   10 Min [x]  Ice     []  Heat    Location/position: To R shoulder in sitting post-session    min []  Vasopneumatic Device, press/temp:     min []  Other:    [] Skin assessment post-treatment (if applicable):    []  intact    []  redness- no adverse reaction     []redness - adverse reaction:            6NB min Therapeutic Exercise:  [x]  See flow sheet   Rationale:      increase ROM and increase strength to improve the patients ability to tolerate desk work activities     23 min Manual Therapy: STM to R deltoid, R post cuff, R UT/lev scap;R GHJ PROM flex ~90 deg   Rationale:      decrease pain, increase ROM and increase tissue extensibility to improve patient's ability to perform reaching activities. Billed With/As:   [x] TE   [] TA   [] Neuro   [] Self Care Patient Education: [x] Review HEP    [] Progressed/Changed HEP based on:   [] positioning   [] body mechanics   [] transfers   [] heat/ice application    [] other:      Other Objective/Functional Measures:    SEE PN      Post Treatment Pain Level (on 0 to 10) scale:  7 / 10     ASSESSMENT  Assessment/Changes in Function:     SEE PN     []  See Progress Note/Recertification   Patient will continue to benefit from skilled PT services to modify and progress therapeutic interventions, address functional mobility deficits, address ROM deficits, address strength deficits, analyze and address soft tissue restrictions, analyze and cue movement patterns, analyze and modify body mechanics/ergonomics, assess and modify postural abnormalities and instruct in home and community integration to attain remaining goals.    Progress toward goals / Updated goals:    Hold therapy await MD recommendation  SEE PN       PLAN  [x]  Upgrade activities as tolerated yes Continue plan of care   []  Discharge due to :    []  Other:      Therapist: Charline Angelucci, LPTA    Date: 10/26/2020 Time: 4:28 PM     Future Appointments   Date Time Provider Stephon Edouard   10/26/2020  3:45 PM Melchor, Gabriel Homans [facility]   11/4/2020 11:40 AM [facility] Open[facility]   11/30/2020  9:30 AM [facility] Bjorn Moreno [facility]   11/30/2020  9:40 AM [facility]

## 2020-10-28 ENCOUNTER — APPOINTMENT (OUTPATIENT)
Dept: PHYSICAL THERAPY | Age: 55
End: 2020-10-28
Payer: COMMERCIAL

## 2021-08-11 ENCOUNTER — HOSPITAL ENCOUNTER (OUTPATIENT)
Dept: PHYSICAL THERAPY | Age: 56
Discharge: HOME | End: 2021-08-11
Payer: COMMERCIAL

## 2021-08-11 PROCEDURE — 97162 PT EVAL MOD COMPLEX 30 MIN: CPT | Performed by: PHYSICAL THERAPIST

## 2021-08-11 PROCEDURE — 97535 SELF CARE MNGMENT TRAINING: CPT | Performed by: PHYSICAL THERAPIST

## 2021-08-11 NOTE — PROGRESS NOTES
[facility] PHYSICAL THERAPY - DAILY TREATMENT NOTE    Patient Name: Kofi Dunne        Date: 2021  : 1965   yes Patient  Verified  Visit #:     Insurance: Payor: Nicole Godinez / Plan: [facility] PPO / Product Type: PPO /      In time: 555 Out time: 630   Total Treatment Time: 35     Medicare/BCBS Time Tracking (below)   Total Timed Codes (min):  15 1:1 Treatment Time:  15     TREATMENT AREA =  Left knee pain [M25.562]    SUBJECTIVE  Pain Level (on 0 to 10 scale):  9  / 10   Medication Changes/New allergies or changes in medical history, any new surgeries or procedures?    no  If yes, update Summary List   Subjective Functional Status/Changes:  []  No changes reported     See ie          OBJECTIVE      15 min Self Care: Hep, sleeping positions, activity modifications, poc   Rationale:    increase ROM and increase strength to improve the patients ability to complete adls    Billed With/As:   [] TE   [] TA   [] Neuro   [] Self Care Patient Education: [x] Review HEP    [] Progressed/Changed HEP based on:   [] positioning   [] body mechanics   [] transfers   [] heat/ice application    [] other:      Other Objective/Functional Measures:    Demo verbal understanding to sc  See ie     Post Treatment Pain Level (on 0 to 10) scale:   9  / 10     ASSESSMENT  Assessment/Changes in Function:     See ie     []  See Progress Note/Recertification   Patient will continue to benefit from skilled PT services to modify and progress therapeutic interventions, address functional mobility deficits, address ROM deficits, address strength deficits, analyze and address soft tissue restrictions, analyze and cue movement patterns, analyze and modify body mechanics/ergonomics, assess and modify postural abnormalities, address imbalance/dizziness and instruct in home and community integration to attain remaining goals.    Progress toward goals / Updated goals:    See ie     PLAN  []  Upgrade activities as tolerated yes Continue plan of care   []  Discharge due to :    []  Other:      Therapist: Agatha Osler, PT    Date: 8/11/2021 Time: 6:36 PM     No future appointments.

## 2021-08-11 NOTE — PROGRESS NOTES
SHREYA Alvarado 1540 THERAPY   [facility], [address],[facility], 70 [facility] - Phone: [phone]  Fax: [phone] OF CARE / [address] [facility]  Patient Name: Dyana Harry : 1965   Medical   Diagnosis: L knee inflammation Treatment Diagnosis: Left knee pain [M25.562]   Onset Date:      Referral Source: Anum Kirby* Start of Care [facility]): 2021   Prior Hospitalization: See medical history Provider #: [id number]   Prior Level of Function: Able to walk/squat without difficulty    Comorbidities: R shoulder surgery 2020, high blood pressure, latex allergy   Medications: Verified on Patient Summary List   The Plan of Care and following information is based on the information from the initial evaluation.   ===========================================================================================  Assessment / key information:  60 F arrives to clinic with c/o L knee pain along posterior medial aspect radiating into calf as well as anterior infrapatellar region. Reports insidious onset this year that is now \"9/10 at best - cannot sit, stand or walk without pain. \" pt states she had an US which cleared DVT but noted Bakers cyst and mri performed that was normal. Pt was very agitated throughout session reporting displeasure in her lack of treatment thus far. I attempted as best I could to perform an assessment but pt was guarded, wincing and writhing in pain. Her arom -9-92P!, +suprapatelallar as well as popliteal fossa edema. Pt stands/sits with hip in er to avoid tke.    Lachman: neg neg  Posterior drawer: Neg Neg  Effusion: Neg post  Medial joint line tenderness: Pos Post  Lateral joint line tenderness: Neg Neg  Blank: Neg Neg  Patella crepitus: neg Pos  Patella Apprehension Neg Pos  Patella tenderness: Neg Pos  Patella grind: postive Neg  Pivot shift: Neg Neg  Valgus stress: No opening No opening  Varus stress: No opening No opening  Reviewed findings with pt that anterior pain/effusion consistent with bursitis is likely contributing to posterior pain and therapy would be beneficial to improve biomechanical issues associated with this. However, she has very little tolerance to any activity. Will discuss these findings with referring office to determine next course of POC.   ===========================================================================================  Eval Complexity: History HIGH Complexity :3+ comorbidities / personal factors will impact the outcome/ POC ;  Examination  HIGH Complexity : 4+ Standardized tests and measures addressing body structure, function, activity limitation and / or participation in recreation ; Presentation HIGH Complexity : Unstable and unpredictable characteristics ;   Decision Making MEDIUM Complexity : FOTO score of 26-74; Overall Complexity MEDIUM  Problem List: pain affecting function, decrease ROM, decrease strength, edema affecting function, impaired gait/ balance, decrease ADL/ functional abilitiies, decrease activity tolerance, decrease flexibility/ joint mobility and decrease transfer abilities   Treatment Plan may include any combination of the following: Therapeutic exercise, Therapeutic activities, Neuromuscular re-education, Physical agent/modality, Gait/balance training, Manual therapy, Patient education, Self Care training, Functional mobility training, Home safety training and Stair training  Patient / Family readiness to learn indicated by: asking questions, trying to perform skills and interest  Persons(s) to be included in education: patient (P)  Barriers to Learning/Limitations: yes;  emotional and physical  Measures taken, if barriers to learnin:1 with therapist, modify interventions   Patient Goal (s): Reduce pain, return to plof, walk normal   Patient self reported health status: fair  Rehabilitation Potential: fair   Short Term Goals: To be accomplished in  2  weeks:  1. Compliant with hep  2.. increase knee flexion >/=100 to A with squat   Long Term Goals: To be accomplished in  4  weeks:  1. Report 25% improvement in s/s in order to restore functional improvement  2. Increase FOTO by 8 points in order to show functional improvement  3. Note daily max pain </=7/10 in increase participation in adls  Frequency / Duration:   Patient to be seen  2  times per week for 4  weeks:  Patient / Caregiver education and instruction: self care, activity modification and brace/ splint application  Therapist Signature: Nadeen Castleman, PT Date: 0/56/1207   Certification Period: na Time: 6:39 PM   ===========================================================================================  I certify that the above Physical Therapy Services are being furnished while the patient is under my care. I agree with the treatment plan and certify that this therapy is necessary. Physician Signature:        Date:       Time:                                        Monika Champagne*  Please sign and return to [facility] at [facility], [facility]. or you may fax the signed copy to [phone]. Thank you.

## 2021-12-14 NOTE — PROGRESS NOTES
.    NICOLÁS Alvarado 1540 THERAPY  [address] [place] [address] 201,[facility], 70 [facility] - Phone: [phone]  Fax: [phone]    DISCHARGE NOTE  Patient Name: Cheyanne  : 1965   Treatment/Medical Diagnosis: Left knee pain [M25.562]   Referral Source: Kennedi Mood*     Date of Initial Visit: 21 Attended Visits: 1 Missed Visits: 0       SUMMARY OF TREATMENT  Pt attended only initial evaluation and     0     follow-ups and then did not return. Therefore a formal reassessment of goals was not performed. RECOMMENDATIONS  Discontinue physical therapy due to patient not returning. If you have any questions/comments please contact us directly at [phone]. Thank you for allowing us to assist in the care of your patient.     Therapist Signature: Gurvinder Teague PT Date: 21     Time: 10:40 AM

## 2021-12-22 NOTE — PROGRESS NOTES
[address], [address] [place] [address], [address] [place] [address] - Phone: [phone]  Fax: [phone] SUMMARY  Patient Name: Yuridia Mattson : 1965   Treatment/Medical Diagnosis: Right shoulder pain [M25.511]   Referral Source: Bernabe Lebron*     Date of Initial Visit: 20 Attended Visits: 4 Missed Visits: 0     CURRENT STATUS  Patient attended 4 PT sessions, including an initial eval for R shoulder pain from 20 to 10/26/20. Patient demo no significant progress with PT interventions, therefore was placed on hold and advised to follow up with your office. Patient did not return to physical therapy following last appointment, therefore a formal reassessment of goals was not performed. Patient is now discharged from physical therapy due to not returning. Thank you for this referral.     Goal/Measure of Progress Goal Met? 1. Daily max pain </=7/10 in order to increase participation in adls   Status at last Eval: 9/10 Current Status: Unable to assess n/a   2.  arom flex 140 to reach overhead   Status at last Eval: 109 deg Current Status: Unable to assess n/a   3.  +3 on GROC in order to show functional improvement   Status at last Eval: 0 Current Status: n/a na       RECOMMENDATIONS  Other: Discharge from physical therapy due to pt not returning. Thank you for this referral.     If you have any questions/comments please contact us directly at [phone]. Thank you for allowing us to assist in the care of your patient.     Therapist Signature: Maryse Merrill [place] Date: 21     Time: 9:21 AM       Bernabe Lebron*

## 2022-01-14 ENCOUNTER — HOSPITAL ENCOUNTER (OUTPATIENT)
Dept: PHYSICAL THERAPY | Age: 57
Discharge: HOME | End: 2022-01-14
Payer: COMMERCIAL

## 2022-01-14 PROCEDURE — 97535 SELF CARE MNGMENT TRAINING: CPT | Performed by: PHYSICAL THERAPIST

## 2022-01-14 PROCEDURE — 97161 PT EVAL LOW COMPLEX 20 MIN: CPT | Performed by: PHYSICAL THERAPIST

## 2022-01-14 NOTE — PROGRESS NOTES
[facility] PHYSICAL THERAPY - DAILY TREATMENT NOTE    Patient Name: Ernesto Daniels        Date: 2022  : 1965   yes Patient  Verified  Visit #:   1   of   5  Insurance: Payor: Vidya López / Plan: [facility] PPO / Product Type: PPO /      In time: 1100 Out time: 1130   Total Treatment Time: 30     Medicare/[employer] Time Tracking (below)   Total Timed Codes (min):  10 1:1 Treatment Time:  10     TREATMENT AREA =  Pain in left knee [M25.562]    SUBJECTIVE  Pain Level (on 0 to 10 scale):  4  / 10   Medication Changes/New allergies or changes in medical history, any new surgeries or procedures?    no  If yes, update Summary List   Subjective Functional Status/Changes:  []  No changes reported     See ie          OBJECTIVE      10 min Self Care: Hep, poc   Rationale:    increase ROM and increase strength to improve the patients ability to complete adls    Billed With/As:   [] TE   [] TA   [] Neuro   [] Self Care Patient Education: [x] Review HEP    [] Progressed/Changed HEP based on:   [] positioning   [] body mechanics   [] transfers   [] heat/ice application    [] other:      Other Objective/Functional Measures:    See ie     Post Treatment Pain Level (on 0 to 10) scale:    10     ASSESSMENT  Assessment/Changes in Function:     See ie     []  See Progress Note/Recertification   Patient will continue to benefit from skilled PT services to modify and progress therapeutic interventions, address functional mobility deficits, address ROM deficits, address strength deficits, analyze and address soft tissue restrictions, analyze and cue movement patterns, analyze and modify body mechanics/ergonomics, assess and modify postural abnormalities and instruct in home and community integration to attain remaining goals.    Progress toward goals / Updated goals:    See ie     PLAN  []  Upgrade activities as tolerated yes Continue plan of care   []  Discharge due to :    []  Other:      Therapist: Elza Ly PT    Date: 1/14/2022 Time: 11:41 AM     Future Appointments   Date Time Provider Stephon Edouard   2/1/2022 10:15 AM Montana Garcia, PT [facility]   2/8/2022  3:30 PM [facility]   2/15/2022  2:30 PM [facility]   2/22/2022  3:30 PM Montana Garcia, PT [facility]   3/1/2022  2:30 PM Meghan De La Rosa, PT [facility]

## 2022-01-14 NOTE — PROGRESS NOTES
. Kiara PHYSICAL THERAPY   [facility] 51, [address],Tara Coronel, [address] [facility] [address] - Phone: [phone]  Fax: [phone] OF CARE / [address]  Patient Name: Adamaris Friedman : 1965   Medical   Diagnosis: L knee inflammation Treatment Diagnosis: Pain in left knee [M25.562]   Onset Date:      Referral Source: Haresh Encinas, * Start of Care [facility]): 2022   Prior Hospitalization: See medical history Provider #: [id number]   Prior Level of Function: Able to walk/squat without difficulty    Comorbidities: R shoulder surgery 2020, high blood pressure, latex allergy   Medications: Verified on Patient Summary List   The Plan of Care and following information is based on the information from the initial evaluation.   ===========================================================================================  Assessment / key information:  60 F arrives to clinic with c/o L knee pain along posterior medial aspect radiating into calf as well as anterior infrapatellar region. I originally saw her in 2021 for this condition but unable to attend due to family medical issues. She did receive injection which helped out significantly. She now returns following second injection with similar s/s - currently 4-10/10 increasing with wb. Pt was very agitated throughout session reporting displeasure in her lack of treatment thus far. I attempted as best I could to perform an assessment but pt was guarded, wincing and writhing in pain. Her arom -8-121P!, +suprapatelallar as well as popliteal fossa edema. Pt stands/sits with hip in er to avoid tke.    Lachman: neg neg  Posterior drawer: Neg Neg  Effusion: Neg post  Medial joint line tenderness: Pos Post  Lateral joint line tenderness: Neg Neg  Blank: Neg Neg  Patella crepitus: neg Pos  Patella Apprehension Neg Pos  Patella tenderness: Neg Pos  Patella grind: postive Neg  Pivot shift: Neg Neg  Valgus stress: No opening No opening  Varus stress: No opening No opening  Reviewed findings with pt that anterior pain/effusion consistent with bursitis is likely contributing to posterior pain and therapy would be beneficial to improve biomechanical issues associated with this. However, she has very little tolerance to any activity. Will discuss these findings with referring office to determine next course of POC.   ===========================================================================================  Eval Complexity: History HIGH Complexity :3+ comorbidities / personal factors will impact the outcome/ POC ;  Examination  HIGH Complexity : 4+ Standardized tests and measures addressing body structure, function, activity limitation and / or participation in recreation ; Presentation HIGH Complexity : Unstable and unpredictable characteristics ;   Decision Making MEDIUM Complexity : FOTO score of 26-74; Overall Complexity MEDIUM  Problem List: pain affecting function, decrease ROM, decrease strength, edema affecting function, impaired gait/ balance, decrease ADL/ functional abilitiies, decrease activity tolerance, decrease flexibility/ joint mobility and decrease transfer abilities   Treatment Plan may include any combination of the following: Therapeutic exercise, Therapeutic activities, Neuromuscular re-education, Physical agent/modality, Gait/balance training, Manual therapy, Patient education, Self Care training, Functional mobility training, Home safety training and Stair training  Patient / Family readiness to learn indicated by: asking questions, trying to perform skills and interest  Persons(s) to be included in education: patient (P)  Barriers to Learning/Limitations: yes;  emotional and physical  Measures taken, if barriers to learnin:1 with therapist, modify interventions   Patient Goal (s): Reduce pain, return to plof, walk normal   Patient self reported health status: fair  Rehabilitation Potential: fair   Short Term Goals: To be accomplished in  2  weeks:  1. Compliant with hep  2.. increase knee flexion >/=120  to A with squat  3. Follow up with Dr. Lenora Alvarado pain mangagement    Long Term Goals: To be accomplished in  4  weeks:  1. Report 25% improvement in s/s in order to restore functional improvement  2. Increase FOTO by 8 points in order to show functional improvement  3. Note daily max pain </=7/10 in increase participation in adls  Frequency / Duration:   Patient to be seen  2  times per week for 4  weeks:  Patient / Caregiver education and instruction: self care, activity modification and brace/ splint application  Therapist Signature: Connor De Anda PT Date: 7/59/8785   Certification Period: na Time: 6:39 PM   ===========================================================================================  I certify that the above Physical Therapy Services are being furnished while the patient is under my care. I agree with the treatment plan and certify that this therapy is necessary. Physician Signature:        Date:       Time:                                        Candida Yi, *  Please sign and return to [facility] at [facility], [facility]. or you may fax the signed copy to [phone]. Thank you.

## 2022-02-01 ENCOUNTER — HOSPITAL ENCOUNTER (OUTPATIENT)
Dept: PHYSICAL THERAPY | Age: 57
Discharge: HOME | End: 2022-02-01
Payer: COMMERCIAL

## 2022-02-01 PROCEDURE — 97140 MANUAL THERAPY 1/> REGIONS: CPT | Performed by: PHYSICAL THERAPIST

## 2022-02-01 NOTE — PROGRESS NOTES
Talat Plata PHYSICAL THERAPY - DAILY TREATMENT NOTE    Patient Name: Sonny Sensing        Date: 2022  : 1965   yes Patient  Verified  Visit #:   2   of   5  Insurance: Payor: Ruba Weiner / Plan: Steffanie Santiago [id number] PPO / Product Type: PPO /      In time: 1005 Out time: 4507   Total Treatment Time: 35     Medicare/[facility] Time Tracking (below)   Total Timed Codes (min):  na 1:1 Treatment Time:  na     TREATMENT AREA =  Pain in left knee [M25.562]    SUBJECTIVE  Pain Level (on 0 to 10 scale):  6  / 10   Medication Changes/New allergies or changes in medical history, any new surgeries or procedures?    no  If yes, update Summary List   Subjective Functional Status/Changes:  []  No changes reported     I went and had a second opinion and they pretty much said that my knee is not bone on bone and at most he could give me naprosyn. I am getting frustrated             OBJECTIVE    5 min Therapeutic Exercise:  [x]  See flow sheet   Rationale:      increase ROM and increase strength to improve the patients ability to complete adls     30 min Manual Therapy: Knee prom, hs and gastroc stretch, tibial er mobs, pat sup/inf mobs, stm quad    Rationale:      decrease pain, increase ROM and increase tissue extensibility to improve patient's ability to complete adls  The manual therapy interventions were performed at a separate and distinct time from the therapeutic activities interventions.         Billed With/As:   [] TE   [] TA   [] Neuro   [] Self Care Patient Education: [x] Review HEP    [] Progressed/Changed HEP based on:   [] positioning   [] body mechanics   [] transfers   [] heat/ice application    [] other:      Other Objective/Functional Measures:    Pt points to medial joint line and just inferior at pes anserine (noted tenderness), flexion >100 reproduced pain, resolved with mid range increased with tke     Post Treatment Pain Level (on 0 to 10) scale:   6  / 10     ASSESSMENT  Assessment/Changes in Function: No increase in pain following session      []  See Progress Note/Recertification   Patient will continue to benefit from skilled PT services to modify and progress therapeutic interventions, address functional mobility deficits, address ROM deficits, address strength deficits, analyze and address soft tissue restrictions, analyze and cue movement patterns, analyze and modify body mechanics/ergonomics, assess and modify postural abnormalities and instruct in home and community integration to attain remaining goals. Progress toward goals / Updated goals:    Pt to see surgeon in one week.  Advised to make a sx journal and research viscosupplementation     PLAN  []  Upgrade activities as tolerated yes Continue plan of care   []  Discharge due to :    []  Other:      Therapist: Morgan Morales PT    Date: 2/1/2022 Time: 10:58 AM     Future Appointments   Date Time Provider Stephon Edouard   2/8/2022  3:30 PM Lacy [facility]   2/15/2022  2:30 PM Lacy [facility]   2/22/2022  3:30 PM Chano Morales PT [facility]   3/1/2022  2:30 PM Chano Morales PT [facility]

## 2022-02-08 ENCOUNTER — HOSPITAL ENCOUNTER (OUTPATIENT)
Dept: PHYSICAL THERAPY | Age: 57
Discharge: HOME | End: 2022-02-08
Payer: COMMERCIAL

## 2022-02-08 PROCEDURE — 97140 MANUAL THERAPY 1/> REGIONS: CPT | Performed by: PHYSICAL THERAPIST

## 2022-02-08 NOTE — PROGRESS NOTES
[facility] PHYSICAL THERAPY - DAILY TREATMENT NOTE    Patient Name: Jerrod Elizalde        Date: 2022  : 1965   yes Patient  Verified  Visit #:   3   of   5  Insurance: Payor: Patricia Rees / Plan: Steffanie Santiago [id number] PPO / Product Type: PPO /      In time: 325 Out time: 355   Total Treatment Time: 30     Medicare/[facility] Time Tracking (below)   Total Timed Codes (min):  na 1:1 Treatment Time:  na     TREATMENT AREA =  Pain in left knee [M25.562]    SUBJECTIVE  Pain Level (on 0 to 10 scale):  6  / 10   Medication Changes/New allergies or changes in medical history, any new surgeries or procedures?    no  If yes, update Summary List   Subjective Functional Status/Changes:  []  No changes reported     See pn            OBJECTIVE    5 min Therapeutic Exercise:  [x]  See flow sheet   Rationale:      increase ROM and increase strength to improve the patients ability to complete adls     25 min Manual Therapy: Knee prom, hs and gastroc stretch, tibial er mobs, pat sup/inf mobs, stm quad    Rationale:      decrease pain, increase ROM and increase tissue extensibility to improve patient's ability to complete adls  The manual therapy interventions were performed at a separate and distinct time from the therapeutic activities interventions.         Billed With/As:   [] TE   [] TA   [] Neuro   [] Self Care Patient Education: [x] Review HEP    [] Progressed/Changed HEP based on:   [] positioning   [] body mechanics   [] transfers   [] heat/ice application    [] other:      Other Objective/Functional Measures:    Ambulated length of clinic x2 after dropping height of cane  See  pn     Post Treatment Pain Level (on 0 to 10) scale:   6  / 10     ASSESSMENT  Assessment/Changes in Function:     See pn     []  See Progress Note/Recertification   Patient will continue to benefit from skilled PT services to modify and progress therapeutic interventions, address functional mobility deficits, address ROM deficits, address strength deficits, analyze and address soft tissue restrictions, analyze and cue movement patterns, analyze and modify body mechanics/ergonomics, assess and modify postural abnormalities and instruct in home and community integration to attain remaining goals.    Progress toward goals / Updated goals:    See pn     PLAN  []  Upgrade activities as tolerated yes Continue plan of care   []  Discharge due to :    []  Other:      Therapist: Elza Ly PT    Date: 2/8/2022 Time: 10:58 AM     Future Appointments   Date Time Provider Stephon Edouard   2/15/2022  2:30 PM Nba Bustos [facility]   2/22/2022  3:30 PM Montana Garcia PT [facility]   3/1/2022  2:30 PM Montana Garcia, GIOVANI [facility]

## 2022-02-08 NOTE — PROGRESS NOTES
.[address] THERAPY  317 [facility], [facility] 201,[facility], 70 [facility] - Phone: [phone]  Fax: [phone]  PROGRESS NOTE  Patient Name: Hermelindo Ribeiro : 1965   Treatment/Medical Diagnosis: Pain in left knee [M25.562]   Referral Source: Cabrera Perry, *     Date of Initial Visit: 21 Attended Visits: 3 Missed Visits: 0     SUMMARY OF TREATMENT  Pt was seen for IE and 2 follow up visits with treatment consisting of therapeutic exercises for LE strength/stabilization, manual therapy and instruction on hep/activity modification. CURRENT STATUS  Lee Anderson has not made any improvements with her knee. All her activities are limited by medial patella femoral pain specifically wb. She had two injections which did not help and manages with tylenol. Her imaging shows patellar chondromalacia otherwise unremarkable. LEFT KNEE EXAM   Alignment: normal  Tenderness to palpation: anteromedial patellofemoral  Effusion: none   Range of motion:   Extension: 0 degrees  Flexion: 123 degrees   Stability:  Varus stress: Stable  Valgus stress: Stable  Lachmans: IA  Posterior drawer: Negative   Strength:   Quadriceps: 5/5  Hamstrings: 5/5   Special Tests: Blank's: Negative  Medial patella mobility: Normal  Lateral patella mobility: Normal  Tracking: Normal  Patellar grind test: Positive   Neurovascular exam:  Light touch sensation intact in all dermatomes. 2+ dorsalis pedis pulse. Normal hip motion. No pain with hip rotation. Discussed low impact, conservative management with her as she does not appear to be an arthroscopic candidate. She was not happy with this and is requesting surgical intervention. Her copay is also high so we have had to reduce dour frequency. At this point the best I could do is to progress her advanced hep and provide parameters for long term exercise.  Advise to follow up with the pain management referral as well Goal/Measure of Progress Goal Met? 1. Report 25% improvement in s/s in order to restore functional improvement    Status at last Eval: na Current Status: 0% no   2. Increase FOTO by 8 points in order to show functional improvement   Status at last Eval: 22/100 Current Status: No change no   3. Note daily max pain </=7/10 in order to increase participation in adls   Status at last Eval: 8/10 Current Status: 8/10 no     New Goals to be achieved in __3__  treatments:  1. Demonstrate I use of SPC for community distances to improve efficiency of gait  2. Achieve goal #2  3. I with advanced hep in order to prepare for dx  RECOMMENDATIONS  Continue therapy remaining 3 visits to establish hep  If you have any questions/comments please contact us directly at ([phone]. Thank you for allowing us to assist in the care of your patient. Therapist Signature: Jackie Chatman PT Date: 2/8/2022     Time: 8:21 AM   NOTE TO PHYSICIAN:  PLEASE COMPLETE THE ORDERS BELOW AND FAX TO   [facility]: [phone]  If you are unable to process this request in 24 hours please contact our office: [phone]    ___ I have read the above report and request that my patient continue as recommended.   ___ I have read the above report and request that my patient continue therapy with the following changes/special instructions:_________________________________________________________   ___ I have read the above report and request that my patient be discharged from therapy.      Physician Signature:        Date:       Time:                                 Pedrito Don, *

## 2022-02-15 ENCOUNTER — APPOINTMENT (OUTPATIENT)
Dept: PHYSICAL THERAPY | Age: 57
End: 2022-02-15
Payer: COMMERCIAL

## 2022-03-01 ENCOUNTER — HOSPITAL ENCOUNTER (OUTPATIENT)
Dept: PHYSICAL THERAPY | Age: 57
Discharge: HOME | End: 2022-03-01
Payer: COMMERCIAL

## 2022-03-01 ENCOUNTER — APPOINTMENT (OUTPATIENT)
Dept: PHYSICAL THERAPY | Age: 57
End: 2022-03-01
Payer: COMMERCIAL

## 2022-03-01 PROCEDURE — 97140 MANUAL THERAPY 1/> REGIONS: CPT | Performed by: PHYSICAL THERAPIST

## 2022-03-01 PROCEDURE — 97535 SELF CARE MNGMENT TRAINING: CPT | Performed by: PHYSICAL THERAPIST

## 2022-03-01 NOTE — PROGRESS NOTES
[facility] PH[facility]ICAL [facility] - DAILY TREATMENT NOTE    Patient Name: Nina Griffith        Date: 3/1/2022  : 1965   yes Patient  Verified  Visit #:  4   of   5  Insurance: Payor: Evangelina Holt / Plan: Steffanie ELOISE Jack [id number] PPO / Product Type: PPO /      In time: 220 Out time: 310   Total Treatment Time: 40     Medicare/[facility] Time Tracking (below)   Total Timed Codes (min):  na 1:1 Treatment Time:  na     TREATMENT AREA =  Pain in left knee [M25.562]    SUBJECTIVE  Pain Level (on 0 to 10 scale):  6  / 10   Medication Changes/New allergies or changes in medical history, any new surgeries or procedures?    no  If yes, update Summary List   Subjective Functional Status/Changes:  []  No changes reported   I saw dr Larry Aburto and he said that I have chondromalacia of the knee. He want to try bracing some gel and viscosupplementation. He also wants me to go see this therapist that treats my knee pain. I am going to call alex back because I have built a repoir with you and want to stay with you. My insurance also does not approve the inejctions           OBJECTIVE    25 min Self care   Rationale:      increase ROM and increase strength to improve the patients ability to complete adls     15 min Manual Therapy: Knee prom, hs and gastroc stretch, tibial er mobs, pat sup/inf mobs, stm quad    Rationale:      decrease pain, increase ROM and increase tissue extensibility to improve patient's ability to complete adls  The manual therapy interventions were performed at a separate and distinct time from the therapeutic activities interventions. Billed With/As:   [] TE   [] TA   [] Neuro   [] Self Care Patient Education: [x] Review HEP    [] Progressed/Changed HEP based on:   [] positioning   [] body mechanics   [] transfers   [] heat/ice application    [] other:      Other Objective/Functional Measures:  Lengthy discussion with pt in regards to consult with pain management. Reviewed the brace and viscosupplementation.  She stated she is going to contact her work insurance to double check coverage. In regards to referral to alternate PT pt stated \"I am very comfortable with you and unless this abida is going to do something different I am not switching. \" advised potential consult might be beneficial. She is going to call pa. In regards to current treatment plan if she was going to continue with me will emphasized strengthening as she is now only a conservative management pt - she was amiable to this. Continues with significant tenderness along pes anserine - ambulate with hip er compensation very poor glute and quad strength        Post Treatment Pain Level (on 0 to 10) scale:   6  / 10     ASSESSMENT  Assessment/Changes in Function:     Pt reports all functions are limited by her knee pain     []  See Progress Note/Recertification   Patient will continue to benefit from skilled PT services to modify and progress therapeutic interventions, address functional mobility deficits, address ROM deficits, address strength deficits, analyze and address soft tissue restrictions, analyze and cue movement patterns, analyze and modify body mechanics/ergonomics, assess and modify postural abnormalities and instruct in home and community integration to attain remaining goals.    Progress toward goals / Updated goals:    Pt cleared by Kayleigh Cornejo and now in a long term pain management proram     PLAN  []  Upgrade activities as tolerated yes Continue plan of care   []  Discharge due to :    []  Other:      Therapist: Dion Donohue PT    Date: 3/1/2022 Time: 10:58 AM     Future Appointments   Date Time Provider Stephon Edouard   3/1/2022  2:30 PM Haresh Mccray  [facility]   3/15/2022  2:30 PM Lucio Mcgregor, GIOVANI  [facility]   3/22/2022  2:45 PM Ceci De La Rosa, PT  [facility]

## 2022-03-15 ENCOUNTER — HOSPITAL ENCOUNTER (OUTPATIENT)
Dept: PHYSICAL THERAPY | Age: 57
Discharge: HOME | End: 2022-03-15
Payer: COMMERCIAL

## 2022-03-15 PROCEDURE — 97140 MANUAL THERAPY 1/> REGIONS: CPT | Performed by: PHYSICAL THERAPIST

## 2022-03-15 PROCEDURE — 97110 THERAPEUTIC EXERCISES: CPT | Performed by: PHYSICAL THERAPIST

## 2022-03-15 NOTE — PROGRESS NOTES
.[address] THERAPY  [address] [place] [address], [facility] 201,[facility], 70 [facility] - Phone: [phone]  Fax: [phone]  PROGRESS NOTE  Patient Name: Leanna Woodward : 1965   Treatment/Medical Diagnosis: Pain in left knee [M25.562]   Referral Source: Sergey Avelar, *     Date of Initial Visit: 21 Attended Visits: 5 Missed Visits: See below     SUMMARY OF TREATMENT  Pt was seen for IE and 4 follow up visits with treatment consisting of therapeutic exercises for LE strength/stabilization, manual therapy and instruction on hep/activity modification. CURRENT STATUS  Elderbólia 35 has finally seen pain management since her last visit in here. Dx with patellar chondromalacia and mal tracking referred back to PT, given topical anti inflammatory and awaiting bracing. Pt still continues to have high pain levels along medial aspect of knee but is \"comfotable with PT now that I have a dx. \" will await brace fitting and continue with PT for strengthening program.     LEFT KNEE EXAM   Alignment: normal  Tenderness to palpation: anteromedial patellofemoral  Effusion: none   Range of motion:   Extension: 0 degrees  Flexion: 123 degrees   Stability:  Varus stress: Stable  Valgus stress: Stable  Lachmans: IA  Posterior drawer: Negative   Strength:   Quadriceps: 5/5  Hamstrings: 5/5   Special Tests: Blank's: Negative  Medial patella mobility: Normal  Lateral patella mobility: Normal  Tracking: Normal  Patellar grind test: Positive   Neurovascular exam:  Light touch sensation intact in all dermatomes. 2+ dorsalis pedis pulse. Normal hip motion. No pain with hip rotation. Goal/Measure of Progress Goal Met? 1. Report 25% improvement in s/s in order to restore functional improvement    Status at last Eval: na Current Status: 10% progressing   2.   Increase FOTO by 8 points in order to show functional improvement   Status at last Eval:  Current Status: No change no   3. Note daily max pain </=7/10 in order to increase participation in adls   Status at last Eval: 9/10 Current Status: 8/10 progressing     New Goals to be achieved in __3__  treatments:  Continue as above  RECOMMENDATIONS  Continue therapy remaining 3 visits to establish hep  If you have any questions/comments please contact us directly at ([phone]. Thank you for allowing us to assist in the care of your patient. Therapist Signature: Sherine Hyde, PT Date: 3/15/2022     Time: 8:21 AM   NOTE TO PHYSICIAN:  PLEASE COMPLETE THE ORDERS BELOW AND FAX TO   [facility] Physical Therapy: [phone]  If you are unable to process this request in 24 hours please contact our office: [phone]    ___ I have read the above report and request that my patient continue as recommended.   ___ I have read the above report and request that my patient continue therapy with the following changes/special instructions:_________________________________________________________   ___ I have read the above report and request that my patient be discharged from therapy.      Physician Signature:        Date:       Time:                                 Dallas Castano, *

## 2022-03-15 NOTE — PROGRESS NOTES
[facility] PHYSICAL THERAPY - DAILY TREATMENT NOTE    Patient Name: Adamaris Idalia        Date: 3/15/2022  : 1965   yes Patient  Verified  Visit #:   5   of   8  Insurance: Payor: BLUE CROSS / Plan: [facility] PPO / Product Type: PPO /      In time: 220 Out time: 300   Total Treatment Time: 40     Medicare/[employer] Time Tracking (below)   Total Timed Codes (min):  na 1:1 Treatment Time:  na     TREATMENT AREA =  Pain in left knee [M25.562]    SUBJECTIVE  Pain Level (on 0 to 10 scale):  6  / 10   Medication Changes/New allergies or changes in medical history, any new surgeries or procedures?    no  If yes, update Summary List   Subjective Functional Status/Changes:  []  No changes reported     See pn            OBJECTIVE    25 min Therapeutic Exercise:  [x]  See flow sheet   Rationale:      increase ROM and increase strength to improve the patients ability to complete adls     15 min Manual Therapy: Knee prom, hs and gastroc stretch, tibial er mobs, pat sup/inf mobs, stm quad    Rationale:      decrease pain, increase ROM and increase tissue extensibility to improve patient's ability to complete adls  The manual therapy interventions were performed at a separate and distinct time from the therapeutic activities interventions.         Billed With/As:   [] TE   [] TA   [] Neuro   [] Self Care Patient Education: [x] Review HEP    [] Progressed/Changed HEP based on:   [] positioning   [] body mechanics   [] transfers   [] heat/ice application    [] other:      Other Objective/Functional Measures:    See pn      Post Treatment Pain Level (on 0 to 10) scale:    10     ASSESSMENT  Assessment/Changes in Function:     See pn     []  See Progress Note/Recertification   Patient will continue to benefit from skilled PT services to modify and progress therapeutic interventions, address functional mobility deficits, address ROM deficits, address strength deficits, analyze and address soft tissue restrictions, analyze and cue movement patterns, analyze and modify body mechanics/ergonomics, assess and modify postural abnormalities and instruct in home and community integration to attain remaining goals.    Progress toward goals / Updated goals:    See pn     PLAN  []  Upgrade activities as tolerated yes Continue plan of care   []  Discharge due to :    []  Other:      Therapist: Jackie Chatman PT    Date: 3/15/2022 Time: 10:58 AM     Future Appointments   Date Time Provider Stephon Edouard   3/22/2022  2:45 PM Gina De La Rosa, PT [facility]

## 2022-03-21 ENCOUNTER — TELEPHONE (OUTPATIENT)
Dept: PHYSICAL THERAPY | Age: 57
End: 2022-03-21

## 2022-04-06 ENCOUNTER — APPOINTMENT (OUTPATIENT)
Dept: PHYSICAL THERAPY | Age: 57
End: 2022-04-06

## 2022-04-11 ENCOUNTER — APPOINTMENT (OUTPATIENT)
Dept: PHYSICAL THERAPY | Age: 57
End: 2022-04-11

## 2022-04-20 ENCOUNTER — APPOINTMENT (OUTPATIENT)
Dept: PHYSICAL THERAPY | Age: 57
End: 2022-04-20

## 2022-04-21 ENCOUNTER — APPOINTMENT (OUTPATIENT)
Dept: PHYSICAL THERAPY | Age: 57
End: 2022-04-21
Payer: COMMERCIAL

## 2022-04-21 ENCOUNTER — HOSPITAL ENCOUNTER (OUTPATIENT)
Dept: PHYSICAL THERAPY | Age: 57
Discharge: HOME | End: 2022-04-21
Payer: COMMERCIAL

## 2022-04-21 PROCEDURE — 97161 PT EVAL LOW COMPLEX 20 MIN: CPT

## 2022-04-21 PROCEDURE — 97110 THERAPEUTIC EXERCISES: CPT

## 2022-04-21 NOTE — PROGRESS NOTES
PHYSICAL THERAPY - DAILY TREATMENT NOTE    Patient Name: Jesus Daniels        Date: 2022  : 1965   YES Patient  Verified  Visit #:     Insurance: Payor: Mona Montoya / Plan: [facility] PPO / Product Type: PPO /      In time: 5:00 Out time: 5:40   Total Treatment Time: 40     Medicare/[facility] Time Tracking (below)   Total Timed Codes (min):  20 1:1 Treatment Time:  20     TREATMENT AREA =  Left knee pain [M25.562]    SUBJECTIVE    Pain Level (on 0 to 10 scale):  2  / 10   Medication Changes/New allergies or changes in medical history, any new surgeries or procedures? NO    If yes, update Summary List   Subjective Functional Status/Changes:  []  No changes reported     See POC          OBJECTIVE    10 min Therapeutic Exercise:  [x]  See flow sheet   Rationale:      increase strength, improve coordination and improve balance to improve the patients ability to amb in community     10 min Self Care: Reviewed diagnosis, prognosis, therapy progression   Rationale:    Improve understanding of injury and therapy to have realistic expectation of therapy to improve compliance/adherence and satisfaction    Billed With/As:   [x] TE   [] TA   [] Neuro   [x] Self Care Patient Education: [x] Review HEP    [] Progressed/Changed HEP based on:   [] positioning   [] body mechanics   [] transfers   [] heat/ice application    [] other:        Other Objective/Functional Measures:    Shown and performed HEP     Post Treatment Pain Level (on 0 to 10) scale:   2 / 10     ASSESSMENT  Assessment/Changes in Function:     See POC     []  See Progress Note/Recertification   Patient will continue to benefit from skilled PT services to modify and progress therapeutic interventions, address functional mobility deficits, address strength deficits, analyze and address soft tissue restrictions, analyze and cue movement patterns and analyze and modify body mechanics/ergonomics to attain goals per POC.    Progress toward goals / Updated goals:    See POC     PLAN  [x]  Upgrade activities as tolerated YES Continue plan of care   []  Discharge due to :    []  Other:      Therapist: Sunny Hutson PT, DPT, OCS, CSCS    Date: 4/21/2022 Time: 5:00 PM

## 2022-04-21 NOTE — PROGRESS NOTES
[facility] PHYSICAL THERAPY  [address] [place] [address] 51, [place] [address],[facility], [address] [facility] [address] - Phone: [phone]  Fax: [phone] OF [facility] / [address]  Patient Name: Miguelina Smith : 1965   Medical   Diagnosis: Left knee pain, chondromalacia patella Treatment Diagnosis: Left knee pain [M25.562]   Onset Date: approx 3 years ago     Referral Source: Va Bolaños NP [facility] [place] 2022   Prior Hospitalization: See medical history Provider #: [id number]   Prior Level of Function: Previous no pain or difficulty with prolonged amb or stair amb   Comorbidities: Asthma, arthritis, latex allergy   Medications: Verified on Patient Summary List   The Plan of Care and following information is based on the information from the initial evaluation.   ===========================================================================================  Assessment / key information:  Miguelina Smith is a 60 y.o.  yo female with Dx: left knee pain, chondromalacia patella, who reports > 3 years of knee pain. Pt rates pain as 9/10 max, 1/10 at best, 2/10 today located at medial patella left knee, increasing with prolonged amb, stairs or general use of left knee. Recently received  brace which Pt reports helps a lot and feels no limited with stair amb with brace. Also on 3rd round of 15 injections for pain control and getting initial round of visco injection in knee soon. Objective: FOTO score = 49 (an established functional score where 100 = no disability). Pt with noted spot brusiing medial knee today from injections recently. Painful active terminal knee ext left, noted tender at medial patella border left. Unable to perform active SLR on left, and reduced on right knee as well. 90/90 HS > 20 on left showing some HS tightness.   Gait showing moderate medial long arch collapse bilat, Pt also reports arch pain so may be sean. Recommended moderate stability footwear to provide better support as well as local warm water pool exercises. Pt instructed in HEP and will f/u in clinic for PT.  ===========================================================================================  Eval Complexity: History MEDIUM  Complexity : 1-2 comorbidities / personal factors will impact the outcome/ POC ;  Examination  HIGH Complexity : 4+ Standardized tests and measures addressing body structure, function, activity limitation and / or participation in recreation ; Presentation LOW Complexity : Stable, uncomplicated ;  Decision Making MEDIUM Complexity : FOTO score of 26-74; Overall Complexity LOW   Problem List: pain affecting function, decrease strength, impaired gait/ balance, decrease ADL/ functional abilitiies, decrease activity tolerance and decrease flexibility/ joint mobility FOTO = 49  Treatment Plan may include any combination of the following: Therapeutic exercise, Therapeutic activities, Neuromuscular re-education, Physical agent/modality, Gait/balance training, Manual therapy, Aquatic therapy, Patient education and Self Care training  Patient / Family readiness to learn indicated by: asking questions, trying to perform skills and interest  Persons(s) to be included in education: patient (P)  Barriers to Learning/Limitations: no  Measures taken, if barriers to learning: N/A   Patient Goal (s): Improve prolonged walking ability   Patient self reported health status: good  Rehabilitation Potential: good   Short Term Goals: To be accomplished in  1-2  weeks:  1. Independent with HEP. 2. Decrease max pain 25-50% to assist with amb stairs and amb in community >15 min.  Long Term Goals: To be accomplished in  4-6  weeks:  1. Decrease max pain 50-75% to assist with amb stairs and amb in community and for age appropriate activity >30 min.   2.  Improve FOTO Functional Status Score by 17 points in order to show significant functional improvement. 3.  Will rate a +5 on Global Rating of Change and be prepared to DC to HEP. Frequency / Duration:   Patient to be seen  2-3  times per week for 4-6  weeks:  Patient / Caregiver education and instruction: self care and exercises  Therapist Signature: Joselin Garcia PT, DPT, OCS, CSCS Date: 7/26/9604   Certification Period: NA Time: 5:00 PM   ===========================================================================================  I certify that the above Physical Therapy Services are being furnished while the patient is under my care. I agree with the treatment plan and certify that this therapy is necessary. Physician Signature:        Date:       Time:                                         Yuridia Jansen NP  Please sign and return to [facility] or you may fax the signed copy to [phone]. Thank you.

## 2022-04-25 ENCOUNTER — APPOINTMENT (OUTPATIENT)
Dept: PHYSICAL THERAPY | Age: 57
End: 2022-04-25

## 2022-04-28 NOTE — PROGRESS NOTES
.[facility] [address] THERAPY  [address] [place] [address] 201,[facility], 70 [facility] - Phone: [phone]  Fax: [phone]  DISCHARGE NOTE  Patient Name: Sonny Javed : 1965   Treatment/Medical Diagnosis: Pain in left knee [M25.562]   Referral Source: Luz Alejandra, *     Date of Initial Visit: 21 Attended Visits: 5 Missed Visits: See below     SUMMARY OF TREATMENT  Pt was seen for IE and 4 follow up visits with treatment consisting of therapeutic exercises for LE strength/stabilization, manual therapy and instruction on hep/activity modification. CURRENT STATUS  Swapnil Landin has finally seen pain management since her last visit in here. Dx with patellar chondromalacia and mal tracking, given brace and voltaren gel. She was referred to specific therapist by pain management so I will d/c her case. Formal reassessment not performed and the below findings were as of that visit. LEFT KNEE EXAM   Alignment: normal  Tenderness to palpation: anteromedial patellofemoral  Effusion: none   Range of motion:   Extension: 0 degrees  Flexion: 123 degrees   Stability:  Varus stress: Stable  Valgus stress: Stable  Lachmans: IA  Posterior drawer: Negative   Strength:   Quadriceps: 5/5  Hamstrings: 5/5   Special Tests: Blank's: Negative  Medial patella mobility: Normal  Lateral patella mobility: Normal  Tracking: Normal  Patellar grind test: Positive   Neurovascular exam:  Light touch sensation intact in all dermatomes. 2+ dorsalis pedis pulse. Normal hip motion. No pain with hip rotation. Goal/Measure of Progress Goal Met? 1. Report 25% improvement in s/s in order to restore functional improvement    Status at last Eval: na Current Status: 10% no   2. Increase FOTO by 8 points in order to show functional improvement   Status at last Eval:  Current Status: No change no   3.   Note daily max pain </=7/10 in order to increase participation in adls Status at last Eval: 9/10 Current Status: 8/10 no       RECOMMENDATIONS  DC as under new care by pain management   If you have any questions/comments please contact us directly at [phone]. Thank you for allowing us to assist in the care of your patient. Therapist Signature: Nettie Garcia PT Date: 4/28/2022     Time: 8:21 AM   NOTE TO PHYSICIAN:  PLEASE COMPLETE THE ORDERS BELOW AND FAX TO   [facility] Physical Therapy: [phone]  If you are unable to process this request in 24 hours please contact our office: [phone]    ___ I have read the above report and request that my patient continue as recommended.   ___ I have read the above report and request that my patient continue therapy with the following changes/special instructions:_________________________________________________________   ___ I have read the above report and request that my patient be discharged from therapy.      Physician Signature:        Date:       Time:                                 Rajwinderthwashington Roberts, *

## 2022-05-04 ENCOUNTER — HOSPITAL ENCOUNTER (OUTPATIENT)
Dept: PHYSICAL THERAPY | Age: 57
Discharge: HOME | End: 2022-05-04
Payer: COMMERCIAL

## 2022-05-04 PROCEDURE — 97110 THERAPEUTIC EXERCISES: CPT | Performed by: PHYSICAL THERAPIST

## 2022-05-04 PROCEDURE — 97140 MANUAL THERAPY 1/> REGIONS: CPT | Performed by: PHYSICAL THERAPIST

## 2022-05-04 NOTE — PROGRESS NOTES
[facility] PHYSICAL THERAPY - DAILY TREATMENT NOTE    Patient Name: Mimi Guevara        Date: 2022  : 1965   yes Patient  Verified  Visit #:     Insurance: Payor: Rey Almaguer / Plan: [facility] PPO / Product Type: PPO /      In time: 215 Out time: 252   Total Treatment Time: 37     Medicare/[facility] Time Tracking (below)   Total Timed Codes (min):  37 1:1 Treatment Time:  37     TREATMENT AREA =  Left knee pain [M25.562]    SUBJECTIVE  Pain Level (on 0 to 10 scale):  4  / 10   Medication Changes/New allergies or changes in medical history, any new surgeries or procedures?    no  If yes, update Summary List   Subjective Functional Status/Changes:  []  No changes reported     Between the brace and injections I feel like my pain is better. I brought my shoes for someone to look at          OBJECTIVE    25 min Therapeutic Exercise:  [x]  See flow sheet   Rationale:      increase ROM, increase strength, improve coordination, improve balance and increase proprioception to improve the patients ability to complete adls     12 min Manual Therapy: Pat sup/inf mobs, stm/stretch rf/tfl   Rationale:      decrease pain, increase ROM and increase tissue extensibility to improve patient's ability to complete adls   The manual therapy interventions were performed at a separate and distinct time from the therapeutic activities interventions.       Billed With/As:   [] TE   [] TA   [] Neuro   [] Self Care Patient Education: [x] Review HEP    [] Progressed/Changed HEP based on:   [] positioning   [] body mechanics   [] transfers   [] heat/ice application    [] other:      Other Objective/Functional Measures:    Deferred footwear inspection for evaluating therapist  Compared to previous session with me reduction in supra patellar swelling and end range pain  te as per flow sheet with cues required 100% for first attempt to ensure form - difficulty with prone glute firing pattern     Post Treatment Pain Level (on 0 to 10) scale:   5  / 10     ASSESSMENT  Assessment/Changes in Function:     Upon exit from clinic pt reported 1 point increase in pain to rehab tech     []  See Progress Note/Recertification   Patient will continue to benefit from skilled PT services to modify and progress therapeutic interventions, address functional mobility deficits, address ROM deficits, address strength deficits, analyze and address soft tissue restrictions, analyze and cue movement patterns, analyze and modify body mechanics/ergonomics, assess and modify postural abnormalities and instruct in home and community integration to attain remaining goals.    Progress toward goals / Updated goals:    No significant changes towards pain goal     PLAN  []  Upgrade activities as tolerated yes Continue plan of care   []  Discharge due to :    []  Other:      Therapist: Alberto Sanchez, PT    Date: 5/4/2022 Time: 2:34 PM     Future Appointments   Date Time Provider Stephon Edouard   5/5/2022  3:15 PM Laura Guerrero [facility]   5/11/2022  3:45 PM Lopez Worthington, PT [facility]   5/12/2022  3:15 PM Gilson Larson, PT [id number]

## 2022-05-05 ENCOUNTER — APPOINTMENT (OUTPATIENT)
Dept: PHYSICAL THERAPY | Age: 57
End: 2022-05-05
Payer: COMMERCIAL

## 2022-05-11 ENCOUNTER — APPOINTMENT (OUTPATIENT)
Dept: PHYSICAL THERAPY | Age: 57
End: 2022-05-11
Payer: COMMERCIAL

## 2022-05-12 ENCOUNTER — APPOINTMENT (OUTPATIENT)
Dept: PHYSICAL THERAPY | Age: 57
End: 2022-05-12
Payer: COMMERCIAL

## 2022-05-16 ENCOUNTER — HOSPITAL ENCOUNTER (OUTPATIENT)
Dept: PHYSICAL THERAPY | Age: 57
Discharge: HOME | End: 2022-05-16
Payer: COMMERCIAL

## 2022-05-16 PROCEDURE — 97140 MANUAL THERAPY 1/> REGIONS: CPT

## 2022-05-16 PROCEDURE — 97112 NEUROMUSCULAR REEDUCATION: CPT

## 2022-05-16 PROCEDURE — 97110 THERAPEUTIC EXERCISES: CPT

## 2022-05-16 NOTE — PROGRESS NOTES
PHYSICAL THERAPY - DAILY TREATMENT NOTE    Patient Name: Elham Zuleta        Date: 2022  : 1965   yes Patient  Verified  Visit #:   3   of   12  Insurance: Payor: BLUE CROSS / Plan: [facility] PPO / Product Type: PPO /      In time: 4:00 Out time: 4:40   Total Treatment Time: 40     Medicare/[employer] Time Tracking (below)   Total Timed Codes (min):  40 1:1 Treatment Time:  40     TREATMENT AREA =  Left knee pain [M25.562]    SUBJECTIVE  Pain Level (on 0 to 10 scale):  3  / 10   Medication Changes/New allergies or changes in medical history, any new surgeries or procedures?    no  If yes, update Summary List   Subjective Functional Status/Changes:  []  No changes reported     No new c/o; has new shoes to assess, knee has been a bit more sore maybe from weather, no functoinal changes, had visco injection last Tuesday. OBJECTIVE      15 min Therapeutic Exercise:  [x]  See flow sheet   Rationale:      increase strength and improve coordination to improve the patients ability to amb/ community     10 min Manual Therapy: Pat mobs, assisted HS stretch, assisted quad stretch SL   Rationale:      decrease pain, increase ROM and increase tissue extensibility to improve patient's ability to amb/ community  The manual therapy interventions were performed at a separate and distinct time from the therapeutic activities interventions.     15 min Neuromuscular Re-ed: [x]  See flow sheet   Rationale:    improve coordination, improve balance and increase proprioception to improve the patients ability to amb/ community    Billed With/As:   [x] TE   [] TA   [] Neuro   [] Self Care Patient Education: [x] Review HEP    [] Progressed/Changed HEP based on:   [] positioning   [] body mechanics   [] transfers   [] heat/ice application    [] other:      Other Objective/Functional Measures:    Noted difficulty with seated SLR, sidelying hip abd and prone hip ext  New shoes still cushion shoes with limited staiblity, instructed to try new store and ask for stability     Post Treatment Pain Level (on 0 to 10) scale:   0  / 10     ASSESSMENT  Assessment/Changes in Function:     No changes in function; good campos to PT, noted hip/quad weakness     []  See Progress Note/Recertification   Patient will continue to benefit from skilled PT services to modify and progress therapeutic interventions, address functional mobility deficits, address ROM deficits, address strength deficits, analyze and address soft tissue restrictions and analyze and cue movement patterns to attain remaining goals. Progress toward goals / Updated goals:    STG 2 progressing, pain 3/10 entering clinic today     PLAN  [x]  Upgrade activities as tolerated yes Continue plan of care   []  Discharge due to :    []  Other:      Therapist: Yasir Mitchell PT, DPT, OCS, CSCS    Date: 5/16/2022 Time: 4:04 PM     No future appointments.

## 2022-05-19 ENCOUNTER — TELEPHONE (OUTPATIENT)
Dept: PHYSICAL THERAPY | Age: 57
End: 2022-05-19

## 2022-05-19 ENCOUNTER — APPOINTMENT (OUTPATIENT)
Dept: PHYSICAL THERAPY | Age: 57
End: 2022-05-19
Payer: COMMERCIAL

## 2022-05-26 ENCOUNTER — HOSPITAL ENCOUNTER (OUTPATIENT)
Dept: PHYSICAL THERAPY | Age: 57
End: 2022-05-26
Payer: COMMERCIAL

## 2022-05-26 ENCOUNTER — TELEPHONE (OUTPATIENT)
Dept: PHYSICAL THERAPY | Age: 57
End: 2022-05-26

## 2022-06-08 ENCOUNTER — HOSPITAL ENCOUNTER (OUTPATIENT)
Dept: PHYSICAL THERAPY | Age: 57
End: 2022-06-08
Payer: COMMERCIAL

## 2022-06-13 ENCOUNTER — HOSPITAL ENCOUNTER (OUTPATIENT)
Dept: PHYSICAL THERAPY | Age: 57
End: 2022-06-13
Payer: COMMERCIAL

## 2022-06-16 ENCOUNTER — HOSPITAL ENCOUNTER (OUTPATIENT)
Dept: PHYSICAL THERAPY | Age: 57
Discharge: HOME | End: 2022-06-16
Payer: COMMERCIAL

## 2022-06-16 PROCEDURE — 97110 THERAPEUTIC EXERCISES: CPT

## 2022-06-16 PROCEDURE — 97112 NEUROMUSCULAR REEDUCATION: CPT

## 2022-06-16 PROCEDURE — 97140 MANUAL THERAPY 1/> REGIONS: CPT

## 2022-06-16 NOTE — PROGRESS NOTES
[address],[facility], [facility] - Phone: [phone]  Fax: [phone]  PROGRESS NOTE  Patient Name: Michelet Ceballos : 1965   Treatment/Medical Diagnosis: Left knee pain [M25.562]   Referral Source: Myke Oleary NP     Date of Initial Visit: 22 Attended Visits: 4 Missed Visits: 0     SUMMARY OF TREATMENT  Has consisted of initial evaluation, HEP, therapeutic exercises for comprehensive LE strength, flexibility, stability, as well as assisted stretches, footwear recommendation. CURRENT STATUS  Michelet Ceballos has made good progress since starting therapy. She was ill for several weeks limiting her ability to attend therapy however she was indep with her HEP. She reports being able to be on feet all day with brace w/o difficulty however having high pain in knee at end of the day. Her strenght is much improved and she is able to perform supine SLR without difficulty 10 reps (noted weakness on second set however). On IE she could not perform supine SLR. Her hip abd strength is also improved to 4/5 on left. Goal/Measure of Progress Goal Met? 1. Decrease max pain 50-75% to assist with amb stairs and amb in community and for age appropriate activity >30 min. Status at last Eval: Pain = 9/10 max Current Status: Pain = 10/10 max no   2. Improve FOTO Functional Status Score by 17 points in order to show significant functional improvement. Status at last Eval: FOTO = 49 Current Status: FOTO = 49 no   3. Will rate a +5 on Global Rating of Change and be prepared to DC to HEP. Status at last Eval: NA Current Status: 0 = no change reported no     New Goals to be achieved in __3-4__  Weeks:  Cont all goals from above. RECOMMENDATIONS  We would like to continue therapy 1-2 x per week for an additional 3-4 weeks for further gains in strength, pain control.     If you have any questions/comments please contact us directly at [phone]. Thank you for allowing us to assist in the care of your patient. Therapist Signature: Joselin Garcia PT, DPT, OCS, CSCS Date: 6/16/2022     Time: 12:52 PM   NOTE TO PHYSICIAN:  PLEASE COMPLETE THE ORDERS BELOW AND FAX TO   [facility] Physical Therapy: [phone]  If you are unable to process this request in 24 hours please contact our office: [phone]    ___ I have read the above report and request that my patient continue as recommended.   ___ I have read the above report and request that my patient continue therapy with the following changes/special instructions:_________________________________________________________   ___ I have read the above report and request that my patient be discharged from therapy.      Physician Signature:        Date:       Time:                                              Yuridia Jansen NP

## 2022-06-16 NOTE — PROGRESS NOTES
PHYSICAL THERAPY - DAILY TREATMENT NOTE    Patient Name: Augustus Mart        Date: 2022  : 1965   yes Patient  Verified  Visit #:      12  Insurance: Payor: BLUE CROSS / Plan: [facility] PPO / Product Type: PPO /      In time: 12:51 Out time: 1:40   Total Treatment Time: 49     Medicare/[employer] Time Tracking (below)   Total Timed Codes (min):  49 1:1 Treatment Time:  49     TREATMENT AREA =  Left knee pain [M25.562]    SUBJECTIVE  Pain Level (on 0 to 10 scale):  4-5  / 10   Medication Changes/New allergies or changes in medical history, any new surgeries or procedures?    no  If yes, update Summary List   Subjective Functional Status/Changes:  []  No changes reported     Has been sick last few weeks, able to be on feet all day; biggest c/o is pain in knee at night; doing HEP indep          OBJECTIVE        25 min Therapeutic Exercise:  [x]? See flow sheet   Rationale:      increase strength and improve coordination to improve the patients ability to amb/ community      10 min Manual Therapy: Pat mobs, assisted HS stretch, assisted quad stretch SL, DTM TFL, vastus lat   Rationale:      decrease pain, increase ROM and increase tissue extensibility to improve patient's ability to amb/ community  The manual therapy interventions were performed at a separate and distinct time from the therapeutic activities interventions.    14 min Neuromuscular Re-ed: [x]?   See flow sheet   Rationale:    improve coordination, improve balance and increase proprioception to improve the patients ability to amb/ community      Billed With/As:   [x] TE   [] TA   [] Neuro   [] Self Care Patient Education: [x] Review HEP    [] Progressed/Changed HEP based on:   [] positioning   [] body mechanics   [] transfers   [] heat/ice application    [] other:      Other Objective/Functional Measures:    FOTO = 49 no change  albe to do SLR w/o sig difficulty on left and right, much improved from IE  Increased to 2 sets of 10 for several exs  Hip abd 4/5 on left   Noted pain/diff iwht prone knee flex (Ely stretch) on left at approx 90 deg. Noted pain at TFL, hip flexors after therex   Post Treatment Pain Level (on 0 to 10) scale:   5  / 10     ASSESSMENT  Assessment/Changes in Function:     Improved strength and campos to activity, still mod to high pain at end of day     [x]  See Progress Note/Recertification   Patient will continue to benefit from skilled PT services to modify and progress therapeutic interventions, address strength deficits, analyze and address soft tissue restrictions, analyze and cue movement patterns and analyze and modify body mechanics/ergonomics to attain remaining goals.    Progress toward goals / Updated goals:    See PN     PLAN  [x]  Upgrade activities as tolerated yes Continue plan of care   []  Discharge due to :    []  Other:      Therapist: Dominic Kate PT, DPT, OCS, CSCS    Date: 6/16/2022 Time: 12:51 PM     Future Appointments   Date Time Provider Stephon Edouard   6/16/2022  1:00 PM Raymond Garcia, [address]

## 2022-06-23 ENCOUNTER — HOSPITAL ENCOUNTER (OUTPATIENT)
Dept: PHYSICAL THERAPY | Age: 57
End: 2022-06-23
Payer: COMMERCIAL

## 2022-06-30 ENCOUNTER — HOSPITAL ENCOUNTER (OUTPATIENT)
Dept: PHYSICAL THERAPY | Age: 57
Discharge: HOME | End: 2022-06-30
Payer: COMMERCIAL

## 2022-06-30 PROCEDURE — 97110 THERAPEUTIC EXERCISES: CPT

## 2022-06-30 PROCEDURE — 97140 MANUAL THERAPY 1/> REGIONS: CPT

## 2022-06-30 NOTE — PROGRESS NOTES
PHYSICAL THERAPY - DAILY TREATMENT NOTE    Patient Name: Mariela Lambert        Date: 2022  : 1965   yes Patient  Verified  Visit #:   5   of   20  Insurance: Payor: BLUE CROSS / Plan: [id number] PPO / Product Type: PPO /      In time: 3:16 Out time: 3:52   Total Treatment Time: 36     Medicare/[employer] Time Tracking (below)   Total Timed Codes (min):  36 1:1 Treatment Time:  36     TREATMENT AREA =  Left knee pain [M25.562]    SUBJECTIVE  Pain Level (on 0 to 10 scale):  1  / 10   Medication Changes/New allergies or changes in medical history, any new surgeries or procedures?    no  If yes, update Summary List   Subjective Functional Status/Changes:  []  No changes reported     Overall pretty happy with the knee, getting US on knee re: Baker's cyst  Biggest fxnl diff is stairs   Max pain last few days 6/10       OBJECTIVE    16 min Therapeutic Exercise:  [x]  See flow sheet   Rationale:      increase ROM and increase strength to improve the patients ability to amb/ community     10 min Manual Therapy: Pat mobs, assisted HS stretch, assisted quad stretch SL   Rationale:      decrease pain, increase ROM and increase tissue extensibility to improve patient's ability to amb/ community  The manual therapy interventions were performed at a separate and distinct time from the therapeutic activities interventions. 10 min Neuromuscular Re-ed: [x]  See flow sheet   Rationale:    increase strength and improve coordination to improve the patients ability to amb/ community      Billed With/As:   [x] TE   [] TA   [] Neuro   [] Self Care Patient Education: [x] Review HEP    [] Progressed/Changed HEP based on:   [] positioning   [] body mechanics   [] transfers   [] heat/ice application    [] other:      Other Objective/Functional Measures:     Added squats to table iwht good campos  Pain/tightness with supine bridge attempts     Post Treatment Pain Level (on 0 to 10) scale:   0  / 10     ASSESSMENT  Assessment/Changes in Function:     Good campos to exs, still rpeorting pain in community with ascending stairs. []  See Progress Note/Recertification   Patient will continue to benefit from skilled PT services to modify and progress therapeutic interventions, address strength deficits, analyze and address soft tissue restrictions, analyze and cue movement patterns and analyze and modify body mechanics/ergonomics to attain remaining goals.    Progress toward goals / Updated goals:    LTG 1 progressing, max pain 6/10 recently     PLAN  [x]  Upgrade activities as tolerated yes Continue plan of care   []  Discharge due to :    []  Other:      Therapist: Madyson Wild PT, DPT, OCS, CSCS    Date: 6/30/2022 Time: 3:19 PM     Future Appointments   Date Time Provider Stephon Edouard   7/6/2022  3:45 PM [facility]   7/11/2022  3:30 PM [facility]   7/14/2022  3:15 PM Shivani Pruitt, PT [id number]

## 2022-07-06 ENCOUNTER — HOSPITAL ENCOUNTER (OUTPATIENT)
Dept: PHYSICAL THERAPY | Age: 57
Discharge: HOME | End: 2022-07-06
Payer: COMMERCIAL

## 2022-07-06 PROCEDURE — 97112 NEUROMUSCULAR REEDUCATION: CPT | Performed by: PHYSICAL THERAPIST

## 2022-07-06 PROCEDURE — 97110 THERAPEUTIC EXERCISES: CPT | Performed by: PHYSICAL THERAPIST

## 2022-07-06 PROCEDURE — 97140 MANUAL THERAPY 1/> REGIONS: CPT | Performed by: PHYSICAL THERAPIST

## 2022-07-06 NOTE — PROGRESS NOTES
[facility] PHYSICAL THERAPY - DAILY TREATMENT NOTE    Patient Name: Bertis Goodpasture        Date: 2022  : 1965   yes Patient  Verified  Visit #:     Insurance: Payor: BLUE CROSS / Plan: [facility] PPO / Product Type: PPO /      In time: 326 Out time: 412   Total Treatment Time: 42     Medicare/[employer] Time Tracking (below)   Total Timed Codes (min):  42 1:1 Treatment Time:  42     TREATMENT AREA =  Left knee pain [M25.562]    SUBJECTIVE  Pain Level (on 0 to 10 scale):  2  / 10   Medication Changes/New allergies or changes in medical history, any new surgeries or procedures?    no  If yes, update Summary List   Subjective Functional Status/Changes:  []  No changes reported     I go tomorrow to get us to see if I have a bakers cyst. I can walk pretty far with my brace ([address] to here) but barely can walk in my house without it          OBJECTIVE    20 min Therapeutic Exercise:  [x]  See flow sheet   Rationale:      increase ROM and increase strength to improve the patients ability to complete adls     10 min Manual Therapy: Mfr/stretch quad, pat mob usp/inf mobs    Rationale:      decrease pain, increase ROM and increase tissue extensibility to improve patient's ability to complete adls  The manual therapy interventions were performed at a separate and distinct time from the therapeutic activities interventions.         12 min Neuromuscular Re-ed: [x]  See flow sheet   Rationale:    improve coordination, improve balance and increase proprioception to improve the patients ability to complete adls         Billed With/As:   [] TE   [] TA   [] Neuro   [] Self Care Patient Education: [x] Review HEP    [] Progressed/Changed HEP based on:   [] positioning   [] body mechanics   [] transfers   [] heat/ice application    [] other:      Other Objective/Functional Measures:    Visible inspection revealed palpable mass along popliteal fossa, pain with end range flexion otherwise asymptomatic  Te/nmr as per flow sheet   slr without lag   Post Treatment Pain Level (on 0 to 10) scale:   2 / 10     ASSESSMENT  Assessment/Changes in Function:     No adverse reactions following session     []  See Progress Note/Recertification   Patient will continue to benefit from skilled PT services to modify and progress therapeutic interventions, address functional mobility deficits, address ROM deficits, address strength deficits, analyze and address soft tissue restrictions, analyze and cue movement patterns, analyze and modify body mechanics/ergonomics, assess and modify postural abnormalities and instruct in home and community integration to attain remaining goals.    Progress toward goals / Updated goals:    Intermittent progress with pain control      PLAN  []  Upgrade activities as tolerated yes Continue plan of care   []  Discharge due to :    []  Other:      Therapist: Chano Tolliver PT    Date: 7/6/2022 Time: 2:41 PM     Future Appointments   Date Time Provider Stephon Edouard   7/6/2022  3:45 PM Isis Prude [facility]   7/11/2022  3:30 PM Isis Prude [facility]   7/14/2022  3:15 PM Caleb Stevens PT [id number]

## 2022-07-11 ENCOUNTER — HOSPITAL ENCOUNTER (OUTPATIENT)
Dept: PHYSICAL THERAPY | Age: 57
Discharge: HOME | End: 2022-07-11
Payer: COMMERCIAL

## 2022-07-11 PROCEDURE — 97110 THERAPEUTIC EXERCISES: CPT | Performed by: PHYSICAL THERAPIST

## 2022-07-11 PROCEDURE — 97112 NEUROMUSCULAR REEDUCATION: CPT | Performed by: PHYSICAL THERAPIST

## 2022-07-11 PROCEDURE — 97140 MANUAL THERAPY 1/> REGIONS: CPT | Performed by: PHYSICAL THERAPIST

## 2022-07-11 NOTE — PROGRESS NOTES
[facility] PHYSICAL THERAPY - DAILY TREATMENT NOTE    Patient Name: Champ Rome        Date: 2022  : 1965   yes Patient  Verified  Visit #:     Insurance: Payor: BLUE CROSS / Plan: [facility] PPO / Product Type: PPO /      In time: 327 Out time: 408   Total Treatment Time: 40     Medicare/[employer] Time Tracking (below)   Total Timed Codes (min):  40 1:1 Treatment Time:  40     TREATMENT AREA =  Left knee pain [M25.562]    SUBJECTIVE  Pain Level (on 0 to 10 scale):  4 / 10   Medication Changes/New allergies or changes in medical history, any new surgeries or procedures?    no  If yes, update Summary List   Subjective Functional Status/Changes:  []  No changes reported     I could not get comfortable last night laying down. I felt like I had more pressure inside the knee than normal        OBJECTIVE    20 min Therapeutic Exercise:  [x]  See flow sheet   Rationale:      increase ROM and increase strength to improve the patients ability to complete adls     10 min Manual Therapy: Mfr/stretch quad, pat mob usp/inf mobs    Rationale:      decrease pain, increase ROM and increase tissue extensibility to improve patient's ability to complete adls  The manual therapy interventions were performed at a separate and distinct time from the therapeutic activities interventions.         10 min Neuromuscular Re-ed: [x]  See flow sheet   Rationale:    improve coordination, improve balance and increase proprioception to improve the patients ability to complete adls         Billed With/As:   [] TE   [] TA   [] Neuro   [] Self Care Patient Education: [x] Review HEP    [] Progressed/Changed HEP based on:   [] positioning   [] body mechanics   [] transfers   [] heat/ice application    [] other:      Other Objective/Functional Measures:  No visible edema in knee compared to previous session  te as per flow sheet with increased rest breaks required due to fatigue and slr reproduced pain   Post Treatment Pain Level (on 0 to 10) scale:   5  / 10     ASSESSMENT  Assessment/Changes in Function:     Increased pain impacted effort and quality of exercises      []  See Progress Note/Recertification   Patient will continue to benefit from skilled PT services to modify and progress therapeutic interventions, address functional mobility deficits, address ROM deficits, address strength deficits, analyze and address soft tissue restrictions, analyze and cue movement patterns, analyze and modify body mechanics/ergonomics, assess and modify postural abnormalities and instruct in home and community integration to attain remaining goals.    Progress toward goals / Updated goals:    Little carryover with pain control in last week      PLAN  []  Upgrade activities as tolerated yes Continue plan of care   []  Discharge due to :    []  Other:      Therapist: Raulito Jackson PT    Date: 7/11/2022 Time: 2:41 PM     Future Appointments   Date Time Provider Stephon Edouard   7/11/2022  3:30 PM Munira Monroe, PT [facility]   7/14/2022  3:15 PM Natasha Blackwood, PT [id number]

## 2022-07-14 ENCOUNTER — TELEPHONE (OUTPATIENT)
Dept: PHYSICAL THERAPY | Age: 57
End: 2022-07-14

## 2022-09-28 NOTE — THERAPY DISCHARGE
[address] [facility], [facility] - Phone: [phone]  Fax: [phone]  DISCHARGE SUMMARY  Patient Name: Papito Benites : 1965   Treatment/Medical Diagnosis: Left knee pain [M25.562]   Referral Source: Ghada Villa NP     Date of Initial Visit: 22 Attended Visits: 7 Missed Visits: 3     SUMMARY OF TREATMENT  Has consisted of initial evaluation, HEP, therapeutic exercises for comprehensive LE strength, flexibility, stability, as well as assisted stretches, footwear recommendation. Was only seen for 7 visits from 22 to 22. CURRENT STATUS  Papito Benites has made gains with strength and function especially when wearing brace, however reports continued high max pain and difficultly when not wearing brace. Goal/Measure of Progress Goal Met? 1. Decrease max pain 50-75% to assist with amb stairs and amb in community and for age appropriate activity >30 min. Status at last Eval: Pain = 9/10 max Current Status: Pain = 10/10 max no   2. Improve FOTO Functional Status Score by 17 points in order to show significant functional improvement. Status at last Eval: FOTO = 49 Current Status: FOTO = 49 no   3. Will rate a +5 on Global Rating of Change and be prepared to DC to HEP. Status at last Eval: NA Current Status: 0 = no change reported no      RECOMMENDATIONS  Discharge therapy due to minimal progression to goals, patient did not return after visit on 22. If you have any questions/comments please contact us directly at [phone]. Thank you for allowing us to assist in the care of your patient.     Therapist Signature: Kassy Donovan PT, DPT, OCS, CSCS Date: 22     Time: 2:55 PM

## 2023-08-30 ENCOUNTER — HOSPITAL ENCOUNTER (OUTPATIENT)
Facility: HOSPITAL | Age: 58
Setting detail: RECURRING SERIES
Discharge: HOME | End: 2023-09-02
Payer: COMMERCIAL

## 2023-08-30 PROCEDURE — 97162 PT EVAL MOD COMPLEX 30 MIN: CPT

## 2023-08-30 PROCEDURE — 97112 NEUROMUSCULAR REEDUCATION: CPT

## 2023-08-30 NOTE — PROGRESS NOTES
PHYSICAL / OCCUPATIONAL THERAPY - DAILY TREATMENT NOTE (updated )    Patient Name: Kike Prado    Date: 2023    : 1965  Insurance: Payor: Oscar Beth / Plan: Esa Munson / Product Type: *No Product type* /      Patient  verified Yes     Visit #   Current / Total 1 12-16   Time   In / Out 345 430   Pain   In / Out 4 4   Subjective Functional Status/Changes: See POC     TREATMENT AREA =  Dizziness and giddiness [R42]    OBJECTIVE       Therapeutic Procedures: Tx Min Billable or 1:1 Min (if diff from Tx Min) Procedure, Rationale, Specifics   15  [id number] Neuromuscular Re-Education (timed):  improve balance, coordination, kinesthetic sense, posture, core stability and proprioception to improve patient's ability to develop conscious control of individual muscles and awareness of position of extremities in order to progress to PLOF and address remaining functional goals. (see flow sheet as applicable)     Details if applicable:              Details if applicable:            Details if applicable:            Details if applicable:            Details if applicable:     13  [facility] Totals Reminder: bill using total billable min of TIMED therapeutic procedures (example: do not include dry needle or estim unattended, both untimed codes, in totals to left)  8-22 min = 1 unit; 23-37 min = 2 units; 38-52 min = 3 units; 53-67 min = 4 units; 68-82 min = 5 units   Total Total     [x]  Patient Education billed concurrently with other procedures   [x] Review HEP    [] Progressed/Changed HEP, detail:    [] Other detail:       Objective Information/Functional Measures/Assessment    See POC.     Patient will continue to benefit from skilled PT / OT services to modify and progress therapeutic interventions, analyze and address functional mobility deficits, analyze and modify for postural abnormalities, analyze and address imbalance/dizziness, and instruct in home and community integration to address functional deficits and
treatments    Patient/ Caregiver education and instruction: Diagnosis, prognosis, self care, activity modification, and exercises [x]  Plan of care has been reviewed with PTA    Certification Period: 8/30/23-10/29/23    Latasha Cornejo, PT       8/30/2023       3:51 PM  ===================================================================  I certify that the above Therapy Services are being furnished while the patient is under my care. I agree with the treatment plan and certify that this therapy is necessary. Physician's Signature:_________________________   DATE:_________   TIME:________                           ADRIANA Page    ** Signature, Date and Time must be completed for valid certification **  Please sign and fax to [facility] [phone].     Thank you  Future Appointments   Date Time Provider [address]   9/7/2023  3:00 PM Airam Rubin, PT [facility] [address]   9/13/2023  5:40 PM Latricia Anderson, PT [facility] [address] [place] [address] [address]   9/15/2023  3:00 PM Latasha Cornejo, PT [facility] [address] [place] [address]   9/20/2023  4:20 PM Latricia Anderson, PT [facility] [address] [place] [address] [address]   9/22/2023  3:00 PM Latricia Anderson, PT [facility] [address] [place] [address] [address]   9/27/2023  4:20 PM Latricia Anderson, PT [facility] [address] [place] [address] [address]

## 2023-09-07 ENCOUNTER — HOSPITAL ENCOUNTER (OUTPATIENT)
Facility: HOSPITAL | Age: 58
Setting detail: RECURRING SERIES
End: 2023-09-07
Payer: COMMERCIAL

## 2023-09-07 NOTE — PROGRESS NOTES
PHYSICAL / OCCUPATIONAL THERAPY - DAILY TREATMENT NOTE (updated )    Patient Name: Randi Aponte    Date: 2023    : 1965  Insurance: Payor: Edu Perales / Plan: [employer] / Product Type: *No Product type* /      Patient  verified YES   Visit #   Current / Total 2 12-16   Time   In / Out *** ***   Pain   In / Out *** ***   Subjective Functional Status/Changes: ***       TREATMENT AREA =  Dizziness and giddiness [R42]    OBJECTIVE    Modalities Rationale:     {[employer] Modality Rationale:93731} to improve patient's ability to progress to PLOF and address remaining functional goals. min [] Estim Unattended, type/location:                                     []  w/US     []  w/ice    []  w/heat    []  TENS insruct      min []  Mechanical Traction: type/lbs                   []  pro   []  sup   []  int   []  cont    []  before manual    []  after manual    min []  Ultrasound, settings/location:      min []  Iontophoresis w/ dexamethasone, location:                                               []  take home patch       []  in clinic    min  unbilled []  Ice     []  Heat    location/position:     min []  Paraffin,  details:     min []  Vasopneumatic Device, press/temp:     min []  Janine Collins / Samla Sea: If using vaso (only need to measure limb vaso being performed on)      pre-treatment girth :       post-treatment girth :       measured at (landmark location) :      min []  Other:    [x] Skin assessment post-treatment (if applicable):    [x]  intact    []  redness- no adverse reaction                 []redness - adverse reaction:        Therapeutic Procedures: Tx Min Billable or 1:1 Min (if diff from Tx Min) Procedure, Rationale, Specifics     13916 Therapeutic Exercise (timed):  increase ROM, strength, coordination, balance, and proprioception to improve patient's ability to progress to PLOF and address remaining functional goals.  (see flow sheet as applicable)     Details if applicable: 65494 Neuromuscular Re-Education (timed):  improve balance, coordination, kinesthetic sense, posture, core stability and proprioception to improve patient's ability to develop conscious control of individual muscles and awareness of position of extremities in order to progress to PLOF and address remaining functional goals. (see flow sheet as applicable)     Details if applicable:       96024 Therapeutic Activity (timed):  use of dynamic activities replicating functional movements to increase ROM, strength, coordination, balance, and proprioception in order to improve patient's ability to progress to PLOF and address remaining functional goals. (see flow sheet as applicable)     Details if applicable:       15700 Manual Therapy (timed):  decrease pain, increase ROM, increase tissue extensibility, and decrease trigger points to improve patient's ability to progress to PLOF and address remaining functional goals. The manual therapy interventions were performed at a separate and distinct time from the therapeutic activities interventions .  (see flow sheet as applicable)     Details if applicable:       {In[employer] Ther Procedures:00746}     Details if applicable:       [facility] Totals Reminder: bill using total billable min of TIMED therapeutic procedures (example: do not include dry needle or estim unattended, both untimed codes, in totals to left)  8-22 min = 1 unit; 23-37 min = 2 units; 38-52 min = 3 units; 53-67 min = 4 units; 68-82 min = 5 units   Total Total     [x]  Patient Education billed concurrently with other procedures   [x] Review HEP    [] Progressed/Changed HEP, detail:    [] Other detail:       Objective Information/Functional Measures/Assessment    ***           PLAN  yes Continue plan of care  [x]  Upgrade activities as tolerated  []  Discharge due to :  []  Other:    Nusrat Yu, PT    9/7/2023    8:47 AM    Future Appointments   Date Time Provider [address]   9/7/2023  3:00 PM Yovanny Rg

## 2023-09-13 ENCOUNTER — HOSPITAL ENCOUNTER (OUTPATIENT)
Facility: HOSPITAL | Age: 58
Setting detail: RECURRING SERIES
Discharge: HOME | End: 2023-09-16
Payer: COMMERCIAL

## 2023-09-13 PROCEDURE — 97112 NEUROMUSCULAR REEDUCATION: CPT

## 2023-09-13 PROCEDURE — 97530 THERAPEUTIC ACTIVITIES: CPT

## 2023-09-13 PROCEDURE — 95992 CANALITH REPOSITIONING PROC: CPT

## 2023-09-13 NOTE — PROGRESS NOTES
PHYSICAL / OCCUPATIONAL THERAPY - DAILY TREATMENT NOTE (updated )    Patient Name: Puneet Cordoba    Date: 2023    : 1965  Insurance: Payor: Rozina Rodriguez / Plan: [employer]-in / Product Type: *No Product type* /      Patient  verified Yes     Visit #   Current / Total 2 8   Time   In / Out 530 610   Pain   In / Out 0 0   Subjective Functional Status/Changes: Pt reports she is having more symptoms of dizziness and pressure. Felt better after first visit to clinic though. TREATMENT AREA =  Dizziness and giddiness [R42]    OBJECTIVE         Therapeutic Procedures: Tx Min Billable or 1:1 Min (if diff from Tx Min) Procedure, Rationale, Specifics   30  [id number] Neuromuscular Re-Education (timed):  improve balance, coordination, kinesthetic sense, posture, core stability and proprioception to improve patient's ability to develop conscious control of individual muscles and awareness of position of extremities in order to progress to PLOF and address remaining functional goals. (see flow sheet as applicable)     Details if applicable:       10  [id number] Canalith Repositioning (un-timed):  correct positional vertigo, decrease dizziness, improve balance to improve patient's ability to progress to PLOF and address remaining functional goals.      Details if applicable:  CRM to L x 2          Details if applicable:            Details if applicable:            Details if applicable:     36  [facility] Totals Reminder: bill using total billable min of TIMED therapeutic procedures (example: do not include dry needle or estim unattended, both untimed codes, in totals to left)  8-22 min = 1 unit; 23-37 min = 2 units; 38-52 min = 3 units; 53-67 min = 4 units; 68-82 min = 5 units   Total Total     [x]  Patient Education billed concurrently with other procedures   [x] Review HEP    [] Progressed/Changed HEP, detail:    [] Other detail:       Objective Information/Functional Measures/Assessment    Initiated static and dynamic

## 2023-09-15 ENCOUNTER — HOSPITAL ENCOUNTER (OUTPATIENT)
Facility: HOSPITAL | Age: 58
Setting detail: RECURRING SERIES
End: 2023-09-15
Payer: COMMERCIAL

## 2023-09-20 ENCOUNTER — HOSPITAL ENCOUNTER (OUTPATIENT)
Facility: HOSPITAL | Age: 58
Setting detail: RECURRING SERIES
Discharge: HOME | End: 2023-09-23
Payer: COMMERCIAL

## 2023-09-20 PROCEDURE — 97530 THERAPEUTIC ACTIVITIES: CPT

## 2023-09-20 PROCEDURE — 95992 CANALITH REPOSITIONING PROC: CPT

## 2023-09-20 PROCEDURE — 97112 NEUROMUSCULAR REEDUCATION: CPT

## 2023-09-20 NOTE — PROGRESS NOTES
PHYSICAL / OCCUPATIONAL THERAPY - DAILY TREATMENT NOTE (updated )    Patient Name: Christina Rendon    Date: 2023    : 1965  Insurance: Payor: Yossi Malone / Plan: Isabelle Argueta / Product Type: *No Product type* /      Patient  verified Yes     Visit #   Current / Total 3 8   Time   In / Out 3 335   Pain   In / Out 0 0   Subjective Functional Status/Changes: Pt reports she is having more symptoms of dizziness and pressure. Felt better after first visit to clinic though. TREATMENT AREA =  Dizziness and giddiness [R42]    OBJECTIVE    Therapeutic Procedures: Tx Min Billable or 1:1 Min (if diff from Tx Min) Procedure, Rationale, Specifics   25 13 [id number] Neuromuscular Re-Education (timed):  improve balance, coordination, kinesthetic sense, posture, core stability and proprioception to improve patient's ability to develop conscious control of individual muscles and awareness of position of extremities in order to progress to PLOF and address remaining functional goals. (see flow sheet as applicable)     Details if applicable:       10 10 [id number] Canalith Repositioning (un-timed):  correct positional vertigo, decrease dizziness, improve balance to improve patient's ability to progress to PLOF and address remaining functional goals.      Details if applicable:  CRM to L x 2          Details if applicable:            Details if applicable:            Details if applicable:     29 21 [facility] Totals Reminder: bill using total billable min of TIMED therapeutic procedures (example: do not include dry needle or estim unattended, both untimed codes, in totals to left)  8-22 min = 1 unit; 23-37 min = 2 units; 38-52 min = 3 units; 53-67 min = 4 units; 68-82 min = 5 units   Total Total     [x]  Patient Education billed concurrently with other procedures   [x] Review HEP    [] Progressed/Changed HEP, detail:    [] Other detail:       Objective Information/Functional Measures/Assessment    Advanced static and dynamic

## 2023-09-22 ENCOUNTER — APPOINTMENT (OUTPATIENT)
Facility: HOSPITAL | Age: 58
End: 2023-09-22
Payer: COMMERCIAL

## 2023-09-27 ENCOUNTER — APPOINTMENT (OUTPATIENT)
Facility: HOSPITAL | Age: 58
End: 2023-09-27
Payer: COMMERCIAL